# Patient Record
Sex: FEMALE | Race: WHITE | Employment: UNEMPLOYED | URBAN - NONMETROPOLITAN AREA
[De-identification: names, ages, dates, MRNs, and addresses within clinical notes are randomized per-mention and may not be internally consistent; named-entity substitution may affect disease eponyms.]

---

## 2017-01-12 ENCOUNTER — TELEPHONE (OUTPATIENT)
Dept: FAMILY MEDICINE CLINIC | Facility: CLINIC | Age: 67
End: 2017-01-12

## 2017-01-12 NOTE — TELEPHONE ENCOUNTER
Patient is ok with waiting until Monday. She went to see a vein specialist and they were the ones that told her she should follow up with ortho and \"spine specialist\". She is going to call [facility] to schedule an appt.  But wanted to let Dr. Kaylynn chavez

## 2017-01-31 ENCOUNTER — PATIENT OUTREACH (OUTPATIENT)
Dept: FAMILY MEDICINE CLINIC | Facility: CLINIC | Age: 67
End: 2017-01-31

## 2017-02-03 ENCOUNTER — TELEPHONE (OUTPATIENT)
Dept: FAMILY MEDICINE CLINIC | Facility: CLINIC | Age: 67
End: 2017-02-03

## 2017-02-03 DIAGNOSIS — M17.0 PRIMARY OSTEOARTHRITIS OF BOTH KNEES: Primary | ICD-10-CM

## 2017-02-03 DIAGNOSIS — M54.9 BACK PAIN, UNSPECIFIED BACK LOCATION, UNSPECIFIED BACK PAIN LATERALITY, UNSPECIFIED CHRONICITY: ICD-10-CM

## 2017-02-03 DIAGNOSIS — M25.569 KNEE PAIN, UNSPECIFIED CHRONICITY, UNSPECIFIED LATERALITY: ICD-10-CM

## 2017-02-07 ENCOUNTER — OFFICE VISIT (OUTPATIENT)
Dept: FAMILY MEDICINE CLINIC | Facility: CLINIC | Age: 67
End: 2017-02-07

## 2017-02-07 ENCOUNTER — APPOINTMENT (OUTPATIENT)
Dept: LAB | Age: 67
End: 2017-02-07
Attending: FAMILY MEDICINE
Payer: MEDICARE

## 2017-02-07 VITALS
TEMPERATURE: 98 F | BODY MASS INDEX: 39 KG/M2 | WEIGHT: 243.25 LBS | DIASTOLIC BLOOD PRESSURE: 76 MMHG | SYSTOLIC BLOOD PRESSURE: 112 MMHG

## 2017-02-07 DIAGNOSIS — E78.2 MIXED HYPERLIPIDEMIA: ICD-10-CM

## 2017-02-07 DIAGNOSIS — I10 ESSENTIAL HYPERTENSION: ICD-10-CM

## 2017-02-07 DIAGNOSIS — Z79.899 ENCOUNTER FOR LONG-TERM (CURRENT) USE OF MEDICATIONS: Primary | ICD-10-CM

## 2017-02-07 LAB
ALBUMIN SERPL-MCNC: 4 G/DL (ref 3.5–4.8)
ALP LIVER SERPL-CCNC: 77 U/L (ref 50–130)
ALT SERPL-CCNC: 20 U/L (ref 14–54)
AST SERPL-CCNC: 12 U/L (ref 15–41)
BILIRUB SERPL-MCNC: 0.8 MG/DL (ref 0.1–2)
BUN BLD-MCNC: 22 MG/DL (ref 8–20)
CALCIUM BLD-MCNC: 8.8 MG/DL (ref 8.3–10.3)
CHLORIDE: 103 MMOL/L (ref 101–111)
CHOLEST SMN-MCNC: 163 MG/DL (ref ?–200)
CO2: 30 MMOL/L (ref 22–32)
CREAT BLD-MCNC: 0.96 MG/DL (ref 0.55–1.02)
GLUCOSE BLD-MCNC: 94 MG/DL (ref 70–99)
HDLC SERPL-MCNC: 46 MG/DL (ref 45–?)
HDLC SERPL: 3.54 {RATIO} (ref ?–4.44)
LDLC SERPL CALC-MCNC: 73 MG/DL (ref ?–130)
M PROTEIN MFR SERPL ELPH: 7.9 G/DL (ref 6.1–8.3)
NONHDLC SERPL-MCNC: 117 MG/DL (ref ?–130)
POTASSIUM SERPL-SCNC: 4.6 MMOL/L (ref 3.6–5.1)
SODIUM SERPL-SCNC: 139 MMOL/L (ref 136–144)
TRIGLYCERIDES: 222 MG/DL (ref ?–150)
VLDL: 44 MG/DL (ref 5–40)

## 2017-02-07 PROCEDURE — 80053 COMPREHEN METABOLIC PANEL: CPT

## 2017-02-07 PROCEDURE — 36415 COLL VENOUS BLD VENIPUNCTURE: CPT

## 2017-02-07 PROCEDURE — 99214 OFFICE O/P EST MOD 30 MIN: CPT | Performed by: FAMILY MEDICINE

## 2017-02-07 PROCEDURE — 80061 LIPID PANEL: CPT

## 2017-02-07 RX ORDER — PRAVASTATIN SODIUM 20 MG
20 TABLET ORAL NIGHTLY
Qty: 90 TABLET | Refills: 1 | Status: SHIPPED | OUTPATIENT
Start: 2017-02-07 | End: 2017-08-06

## 2017-02-07 RX ORDER — LISINOPRIL AND HYDROCHLOROTHIAZIDE 20; 12.5 MG/1; MG/1
1 TABLET ORAL DAILY
Qty: 90 TABLET | Refills: 1 | Status: SHIPPED | OUTPATIENT
Start: 2017-02-07 | End: 2017-09-01

## 2017-02-07 RX ORDER — MELOXICAM 15 MG/1
15 TABLET ORAL DAILY
COMMUNITY
Start: 2017-02-06 | End: 2017-06-09

## 2017-02-07 NOTE — ASSESSMENT & PLAN NOTE
As for her hypertension, Blood Pressure is well controlled, no significant medication side effects noted.    PLAN: will continue present medications, reviewed diet, exercise and weight control, and labs as ordered

## 2017-02-07 NOTE — PATIENT INSTRUCTIONS
What Is a Normal Blood Pressure?   The Joint National Committee on Prevention, Detection, Evaluation, and Treatment of High Blood Pressure has classified blood pressure measurements into several categories:  • Normal blood pressure is systolic pressure less Dysfunction  • Problems with your vision    Overview  \"Blood pressure\" is the force of blood pushing against the walls of the arteries as the heart pumps blood. If this pressure rises and stays high over time, it can damage the body in many ways.   About drinks* per day. Women and lighter weight persons: limit to <= 1 drink* per day. 2-4 mmHg      * 1 drink = ½ oz or 15 mL ethanol (e.g. 12 oz beer, 5 oz wine, 1.5 oz 80-proof whiskey. Effects are dose and time dependent.

## 2017-02-07 NOTE — ASSESSMENT & PLAN NOTE
As for her cholesterol, Lipids are well controlled, no significant medication side effects noted.    PLAN: will continue present medications, reviewed diet, exercise and weight control, and labs as ordered

## 2017-02-07 NOTE — PROGRESS NOTES
Nimco Holt is a 73year old female. Patient presents with:  Medication Follow-Up: room 5      HPI:   Patient presents for recheck of her  hypertension.  Pt has been taking medications as instructed, no medication side effects, home BP monitoring not re breath with exertion  CARDIOVASCULAR: denies chest pain on exertion  NEURO: denies headaches    EXAM:   /76 mmHg  Temp(Src) 97.5 °F (36.4 °C) (Temporal)  Wt 243 lb 4 oz Body mass index is 38.68 kg/(m^2).       GENERAL: well developed, well nourished,i Disp Refills    Pravastatin Sodium 20 MG Oral Tab 90 tablet 1      Sig: Take 1 tablet (20 mg total) by mouth nightly. Lisinopril-Hydrochlorothiazide 20-12.5 MG Oral Tab 90 tablet 1      Sig: Take 1 tablet by mouth daily.                [place],

## 2017-02-08 DIAGNOSIS — I10 ESSENTIAL HYPERTENSION: ICD-10-CM

## 2017-02-08 DIAGNOSIS — E78.2 MIXED HYPERLIPIDEMIA: Primary | ICD-10-CM

## 2017-02-09 ENCOUNTER — MED REC SCAN ONLY (OUTPATIENT)
Dept: FAMILY MEDICINE CLINIC | Facility: CLINIC | Age: 67
End: 2017-02-09

## 2017-06-09 RX ORDER — MELOXICAM 15 MG/1
15 TABLET ORAL DAILY
Qty: 30 TABLET | Refills: 0 | Status: SHIPPED | OUTPATIENT
Start: 2017-06-09 | End: 2017-07-11

## 2017-07-11 ENCOUNTER — OFFICE VISIT (OUTPATIENT)
Dept: FAMILY MEDICINE CLINIC | Facility: CLINIC | Age: 67
End: 2017-07-11

## 2017-07-11 VITALS
BODY MASS INDEX: 39.41 KG/M2 | HEART RATE: 80 BPM | WEIGHT: 245.25 LBS | TEMPERATURE: 98 F | DIASTOLIC BLOOD PRESSURE: 70 MMHG | SYSTOLIC BLOOD PRESSURE: 112 MMHG | HEIGHT: 66.25 IN

## 2017-07-11 DIAGNOSIS — Z78.0 POSTMENOPAUSAL: ICD-10-CM

## 2017-07-11 DIAGNOSIS — Z12.31 VISIT FOR SCREENING MAMMOGRAM: ICD-10-CM

## 2017-07-11 DIAGNOSIS — M17.0 PRIMARY OSTEOARTHRITIS OF BOTH KNEES: Primary | ICD-10-CM

## 2017-07-11 DIAGNOSIS — Z00.00 ENCOUNTER FOR MEDICARE ANNUAL WELLNESS EXAM: ICD-10-CM

## 2017-07-11 DIAGNOSIS — Z12.11 SCREENING FOR COLON CANCER: ICD-10-CM

## 2017-07-11 DIAGNOSIS — Z13.39 SCREENING FOR ALCOHOL PROBLEM: ICD-10-CM

## 2017-07-11 DIAGNOSIS — Z13.31 DEPRESSION SCREENING: ICD-10-CM

## 2017-07-11 PROCEDURE — G0442 ANNUAL ALCOHOL SCREEN 15 MIN: HCPCS | Performed by: FAMILY MEDICINE

## 2017-07-11 PROCEDURE — G0438 PPPS, INITIAL VISIT: HCPCS | Performed by: FAMILY MEDICINE

## 2017-07-11 RX ORDER — MELOXICAM 15 MG/1
15 TABLET ORAL DAILY
Qty: 90 TABLET | Refills: 1 | Status: SHIPPED | OUTPATIENT
Start: 2017-07-11 | End: 2018-01-10

## 2017-07-11 NOTE — PATIENT INSTRUCTIONS
Bethanie Medrano Bryson's SCREENING SCHEDULE   Tests on this list are recommended by your physician but may not be covered, or covered at this frequency, by your insurer. Please check with your insurance carrier before scheduling to verify coverage.    PREVENTATIV often if abnormal Colonoscopy,10 Years due on 06/08/2001 Update Health Maintenance if applicable    Flex Sigmoidoscopy Screen  Covered every 5 years No results found for this or any previous visit. No flowsheet data found.      Fecal Occult Blood   Covered once after your 65th birthday    Pneumococcal 23 (Pneumovax)  Covered Once after 65 No orders found for this or any previous visit.  Please get once after your 65th birthday    Hepatitis B for Moderate/High Risk       No orders found for this or any previou

## 2017-07-11 NOTE — PROGRESS NOTES
HPI:   Bishnu Lynch is a 78year old female who presents for a Medicare Initial Annual Wellness visit (Once after 12 month Medicare anniversary) .     Feels well    Her last annual assessment has been over 1 year: Annual Physical due on 06/28/2017 includes cath percutaneous  transluminal coronary angioplasty (2000). Her family history includes Other in her father; Renal Disease in her mother. SOCIAL HISTORY:   She  reports that she quit smoking about 16 years ago.  She has never used smokeless t daily.         Ms. Tania Jean-Baptiste already takes aspirin and has it on her medication list.     Diet assessment: good     Advanced Directive:  Living Will on file in [address]? Terrence Moment does not have a Living Will on file in [address]  Discussed with patient and kamron have 3 or more medical conditions?: 0-No    Have you fallen in the last 12 months?: 0-No    Do you accidently lose urine?: 0-No    Do you have difficulty seeing?: 0-No    Do you have any difficulty walking or getting up?: 0-No    Do you have any tripping h years No results found for this or any previous visit. No flowsheet data found. Fecal Occult Blood Annually No results found for: FOB No flowsheet data found.     Glaucoma Screening      Ophthalmology Visit Annually: Diabetics, FHx Glaucoma, AA>50, Hisp DISEASE MONITORING Internal Lab or Procedure External Lab or Procedure   Annual Monitoring of Persistent     Medications (ACE/ARB, digoxin diuretics, anticonvulsants.)    Potassium  Annually Potassium (mmol/L)   Date Value   02/07/2017 4.6    No flowsheet

## 2017-07-24 ENCOUNTER — APPOINTMENT (OUTPATIENT)
Dept: LAB | Facility: HOSPITAL | Age: 67
End: 2017-07-24
Attending: FAMILY MEDICINE
Payer: MEDICARE

## 2017-07-24 DIAGNOSIS — Z12.11 SCREENING FOR COLON CANCER: ICD-10-CM

## 2017-07-24 PROCEDURE — 82272 OCCULT BLD FECES 1-3 TESTS: CPT

## 2017-08-07 ENCOUNTER — HOSPITAL ENCOUNTER (OUTPATIENT)
Dept: BONE DENSITY | Age: 67
Discharge: HOME | End: 2017-08-07
Attending: FAMILY MEDICINE
Payer: MEDICARE

## 2017-08-07 DIAGNOSIS — Z78.0 POSTMENOPAUSAL: ICD-10-CM

## 2017-08-07 PROCEDURE — 77080 DXA BONE DENSITY AXIAL: CPT | Performed by: FAMILY MEDICINE

## 2017-09-01 ENCOUNTER — TELEPHONE (OUTPATIENT)
Dept: FAMILY MEDICINE CLINIC | Facility: CLINIC | Age: 67
End: 2017-09-01

## 2017-09-01 DIAGNOSIS — I10 ESSENTIAL HYPERTENSION: ICD-10-CM

## 2017-09-01 RX ORDER — PRAVASTATIN SODIUM 20 MG
TABLET ORAL
Qty: 90 TABLET | Refills: 0 | Status: SHIPPED | OUTPATIENT
Start: 2017-09-01 | End: 2017-12-05

## 2017-09-01 RX ORDER — LISINOPRIL AND HYDROCHLOROTHIAZIDE 20; 12.5 MG/1; MG/1
1 TABLET ORAL DAILY
Qty: 90 TABLET | Refills: 0 | Status: SHIPPED | OUTPATIENT
Start: 2017-09-01 | End: 2017-12-05

## 2017-09-01 NOTE — TELEPHONE ENCOUNTER
Last Ov: 7/11/2017  112/70  Last labs: 2/7/2017  Pravastatin: 2/7/2017 #90 w/1Rf  Lisinopril: 2/7/2017 #90 w/ 1RF    No future appointments.   Letter sent for labs

## 2017-09-12 ENCOUNTER — APPOINTMENT (OUTPATIENT)
Dept: LAB | Age: 67
End: 2017-09-12
Attending: FAMILY MEDICINE
Payer: MEDICARE

## 2017-09-12 DIAGNOSIS — I10 ESSENTIAL HYPERTENSION: ICD-10-CM

## 2017-09-12 DIAGNOSIS — E78.2 MIXED HYPERLIPIDEMIA: ICD-10-CM

## 2017-09-12 LAB
ALBUMIN SERPL-MCNC: 4 G/DL (ref 3.5–4.8)
ALP LIVER SERPL-CCNC: 75 U/L (ref 55–142)
ALT SERPL-CCNC: 29 U/L (ref 14–54)
AST SERPL-CCNC: 17 U/L (ref 15–41)
BILIRUB SERPL-MCNC: 0.7 MG/DL (ref 0.1–2)
BUN BLD-MCNC: 23 MG/DL (ref 8–20)
CALCIUM BLD-MCNC: 9.5 MG/DL (ref 8.3–10.3)
CHLORIDE: 105 MMOL/L (ref 101–111)
CHOLEST SMN-MCNC: 170 MG/DL (ref ?–200)
CO2: 29 MMOL/L (ref 22–32)
CREAT BLD-MCNC: 1.09 MG/DL (ref 0.55–1.02)
GLUCOSE BLD-MCNC: 108 MG/DL (ref 70–99)
HDLC SERPL-MCNC: 46 MG/DL (ref 45–?)
HDLC SERPL: 3.7 {RATIO} (ref ?–4.44)
LDLC SERPL CALC-MCNC: 79 MG/DL (ref ?–130)
LDLC SERPL-MCNC: 45 MG/DL (ref 5–40)
M PROTEIN MFR SERPL ELPH: 8.1 G/DL (ref 6.1–8.3)
NONHDLC SERPL-MCNC: 124 MG/DL (ref ?–130)
POTASSIUM SERPL-SCNC: 4.8 MMOL/L (ref 3.6–5.1)
SODIUM SERPL-SCNC: 141 MMOL/L (ref 136–144)
TRIGLYCERIDES: 224 MG/DL (ref ?–150)

## 2017-09-12 PROCEDURE — 80053 COMPREHEN METABOLIC PANEL: CPT

## 2017-09-12 PROCEDURE — 36415 COLL VENOUS BLD VENIPUNCTURE: CPT

## 2017-09-12 PROCEDURE — 80061 LIPID PANEL: CPT

## 2017-09-14 DIAGNOSIS — E78.2 MIXED HYPERLIPIDEMIA: Primary | ICD-10-CM

## 2017-12-05 ENCOUNTER — TELEPHONE (OUTPATIENT)
Dept: FAMILY MEDICINE CLINIC | Facility: CLINIC | Age: 67
End: 2017-12-05

## 2017-12-05 DIAGNOSIS — I10 ESSENTIAL HYPERTENSION: ICD-10-CM

## 2017-12-05 RX ORDER — LISINOPRIL AND HYDROCHLOROTHIAZIDE 20; 12.5 MG/1; MG/1
1 TABLET ORAL DAILY
Qty: 90 TABLET | Refills: 0 | Status: SHIPPED | OUTPATIENT
Start: 2017-12-05 | End: 2018-03-05

## 2017-12-05 RX ORDER — PRAVASTATIN SODIUM 20 MG
TABLET ORAL
Qty: 90 TABLET | Refills: 0 | Status: ON HOLD | OUTPATIENT
Start: 2017-12-05 | End: 2018-02-13

## 2017-12-05 NOTE — TELEPHONE ENCOUNTER
Lisinopril-Hydrochlorothiazide 20-12.5 MG Oral Tab   Pravastatin Sodium 20 MG Oral Tab  Call to [facility] on [address]

## 2017-12-05 NOTE — TELEPHONE ENCOUNTER
Last OV: 7/11/2017  Last labs: 9/12/2017  Pravastatin: 9/1/2017 #90 no Rf  Lisinopril: 9/1/2017  #90 no RF

## 2018-01-01 ENCOUNTER — MED REC SCAN ONLY (OUTPATIENT)
Dept: FAMILY MEDICINE CLINIC | Facility: CLINIC | Age: 68
End: 2018-01-01

## 2018-01-04 ENCOUNTER — TELEPHONE (OUTPATIENT)
Dept: FAMILY MEDICINE CLINIC | Facility: CLINIC | Age: 68
End: 2018-01-04

## 2018-01-04 NOTE — TELEPHONE ENCOUNTER
Jerome Guevara Nurse   Caller: Unspecified (Today,  9:37 AM)             DR Ruth KRAMER RETURNED A CALL      [phone]      States that she is not considered her patient since she has not been seen in 3 years.   They did offer to

## 2018-01-04 NOTE — TELEPHONE ENCOUNTER
Dr Sandrine Ray cancelled her appt, she needs a new cardio referral. She is keeping her pre-op appt with [facility] on 1/22, hoping she can find another cardio.

## 2018-01-08 NOTE — TELEPHONE ENCOUNTER
I would recommend staying with the practice since they have her records    Would recommend  Dr jodi Pappas

## 2018-01-08 NOTE — TELEPHONE ENCOUNTER
Needs new cardiologist, Hasn't seen Dr. Venus Clay for 3 years and rather than schedule with an associate she would like your opinion.

## 2018-01-08 NOTE — TELEPHONE ENCOUNTER
Patient advised, she's going to try to see Dr. Mimi Nogueira because he practices at [facility].

## 2018-01-10 ENCOUNTER — TELEPHONE (OUTPATIENT)
Dept: FAMILY MEDICINE CLINIC | Facility: CLINIC | Age: 68
End: 2018-01-10

## 2018-01-10 RX ORDER — MELOXICAM 15 MG/1
15 TABLET ORAL DAILY
Qty: 90 TABLET | Refills: 1 | Status: SHIPPED | OUTPATIENT
Start: 2018-01-10 | End: 2018-02-15

## 2018-01-10 NOTE — TELEPHONE ENCOUNTER
Meloxicam 15 MG - [facility] in [facility] on [address].

## 2018-01-16 RX ORDER — SENNOSIDES 8.6 MG
1300 CAPSULE ORAL DAILY
Status: ON HOLD | COMMUNITY
End: 2018-04-24

## 2018-01-22 ENCOUNTER — OFFICE VISIT (OUTPATIENT)
Dept: FAMILY MEDICINE CLINIC | Facility: CLINIC | Age: 68
End: 2018-01-22

## 2018-01-22 ENCOUNTER — LAB ENCOUNTER (OUTPATIENT)
Dept: LAB | Age: 68
End: 2018-01-22
Attending: FAMILY MEDICINE
Payer: MEDICARE

## 2018-01-22 VITALS
HEIGHT: 66.5 IN | DIASTOLIC BLOOD PRESSURE: 64 MMHG | OXYGEN SATURATION: 96 % | BODY MASS INDEX: 39.39 KG/M2 | HEART RATE: 86 BPM | SYSTOLIC BLOOD PRESSURE: 110 MMHG | TEMPERATURE: 98 F | WEIGHT: 248 LBS

## 2018-01-22 DIAGNOSIS — I10 ESSENTIAL HYPERTENSION: ICD-10-CM

## 2018-01-22 DIAGNOSIS — Z01.810 PRE-OPERATIVE CARDIOVASCULAR EXAMINATION: ICD-10-CM

## 2018-01-22 DIAGNOSIS — Z98.61 S/P PTCA (PERCUTANEOUS TRANSLUMINAL CORONARY ANGIOPLASTY): ICD-10-CM

## 2018-01-22 DIAGNOSIS — Z01.818 PREPROCEDURAL EXAMINATION: ICD-10-CM

## 2018-01-22 DIAGNOSIS — M17.0 PRIMARY OSTEOARTHRITIS OF BOTH KNEES: ICD-10-CM

## 2018-01-22 DIAGNOSIS — M17.11 PRIMARY OSTEOARTHRITIS OF RIGHT KNEE: ICD-10-CM

## 2018-01-22 DIAGNOSIS — Z01.812 PRE-PROCEDURE LAB EXAM: ICD-10-CM

## 2018-01-22 DIAGNOSIS — Z01.818 PREPROCEDURAL EXAMINATION: Primary | ICD-10-CM

## 2018-01-22 LAB
ALBUMIN SERPL-MCNC: 4.2 G/DL (ref 3.5–4.8)
ALP LIVER SERPL-CCNC: 70 U/L (ref 55–142)
ALT SERPL-CCNC: 23 U/L (ref 14–54)
ANTIBODY SCREEN: NEGATIVE
AST SERPL-CCNC: 15 U/L (ref 15–41)
BASOPHILS # BLD AUTO: 0.07 X10(3) UL (ref 0–0.1)
BASOPHILS NFR BLD AUTO: 0.9 %
BILIRUB SERPL-MCNC: 0.7 MG/DL (ref 0.1–2)
BILIRUB UR QL STRIP.AUTO: NEGATIVE
BUN BLD-MCNC: 31 MG/DL (ref 8–20)
CALCIUM BLD-MCNC: 9.4 MG/DL (ref 8.3–10.3)
CHLORIDE: 104 MMOL/L (ref 101–111)
CLARITY UR REFRACT.AUTO: CLEAR
CO2: 30 MMOL/L (ref 22–32)
COLOR UR AUTO: YELLOW
CREAT BLD-MCNC: 1.04 MG/DL (ref 0.55–1.02)
EOSINOPHIL # BLD AUTO: 0.2 X10(3) UL (ref 0–0.3)
EOSINOPHIL NFR BLD AUTO: 2.7 %
ERYTHROCYTE [DISTWIDTH] IN BLOOD BY AUTOMATED COUNT: 14.2 % (ref 11.5–16)
GLUCOSE BLD-MCNC: 109 MG/DL (ref 70–99)
GLUCOSE UR STRIP.AUTO-MCNC: NEGATIVE MG/DL
HCT VFR BLD AUTO: 38.6 % (ref 34–50)
HGB BLD-MCNC: 12.1 G/DL (ref 12–16)
IMMATURE GRANULOCYTE COUNT: 0.02 X10(3) UL (ref 0–1)
IMMATURE GRANULOCYTE RATIO %: 0.3 %
KETONES UR STRIP.AUTO-MCNC: NEGATIVE MG/DL
LEUKOCYTE ESTERASE UR QL STRIP.AUTO: NEGATIVE
LYMPHOCYTES # BLD AUTO: 1.97 X10(3) UL (ref 0.9–4)
LYMPHOCYTES NFR BLD AUTO: 26.4 %
M PROTEIN MFR SERPL ELPH: 8.3 G/DL (ref 6.1–8.3)
MCH RBC QN AUTO: 27.3 PG (ref 27–33.2)
MCHC RBC AUTO-ENTMCNC: 31.3 G/DL (ref 31–37)
MCV RBC AUTO: 86.9 FL (ref 81–100)
MONOCYTES # BLD AUTO: 0.41 X10(3) UL (ref 0.1–0.6)
MONOCYTES NFR BLD AUTO: 5.5 %
NEUTROPHIL ABS PRELIM: 4.78 X10 (3) UL (ref 1.3–6.7)
NEUTROPHILS # BLD AUTO: 4.78 X10(3) UL (ref 1.3–6.7)
NEUTROPHILS NFR BLD AUTO: 64.2 %
NITRITE UR QL STRIP.AUTO: NEGATIVE
PH UR STRIP.AUTO: 5 [PH] (ref 4.5–8)
PLATELET # BLD AUTO: 234 10(3)UL (ref 150–450)
POTASSIUM SERPL-SCNC: 5 MMOL/L (ref 3.6–5.1)
PROT UR STRIP.AUTO-MCNC: NEGATIVE MG/DL
RBC # BLD AUTO: 4.44 X10(6)UL (ref 3.8–5.1)
RBC UR QL AUTO: NEGATIVE
RED CELL DISTRIBUTION WIDTH-SD: 45 FL (ref 35.1–46.3)
RH BLOOD TYPE: POSITIVE
SODIUM SERPL-SCNC: 139 MMOL/L (ref 136–144)
SP GR UR STRIP.AUTO: 1.02 (ref 1–1.03)
UROBILINOGEN UR STRIP.AUTO-MCNC: <2 MG/DL
WBC # BLD AUTO: 7.5 X10(3) UL (ref 4–13)

## 2018-01-22 PROCEDURE — 36415 COLL VENOUS BLD VENIPUNCTURE: CPT

## 2018-01-22 PROCEDURE — 87081 CULTURE SCREEN ONLY: CPT

## 2018-01-22 PROCEDURE — 80053 COMPREHEN METABOLIC PANEL: CPT

## 2018-01-22 PROCEDURE — 71046 X-RAY EXAM CHEST 2 VIEWS: CPT | Performed by: FAMILY MEDICINE

## 2018-01-22 PROCEDURE — 81003 URINALYSIS AUTO W/O SCOPE: CPT

## 2018-01-22 PROCEDURE — 93000 ELECTROCARDIOGRAM COMPLETE: CPT | Performed by: FAMILY MEDICINE

## 2018-01-22 PROCEDURE — 85025 COMPLETE CBC W/AUTO DIFF WBC: CPT

## 2018-01-22 PROCEDURE — 90686 IIV4 VACC NO PRSV 0.5 ML IM: CPT | Performed by: FAMILY MEDICINE

## 2018-01-22 PROCEDURE — 99215 OFFICE O/P EST HI 40 MIN: CPT | Performed by: FAMILY MEDICINE

## 2018-01-22 PROCEDURE — 86900 BLOOD TYPING SEROLOGIC ABO: CPT

## 2018-01-22 PROCEDURE — 86850 RBC ANTIBODY SCREEN: CPT

## 2018-01-22 PROCEDURE — 86901 BLOOD TYPING SEROLOGIC RH(D): CPT

## 2018-01-22 PROCEDURE — G0008 ADMIN INFLUENZA VIRUS VAC: HCPCS | Performed by: FAMILY MEDICINE

## 2018-01-22 NOTE — H&P
Bren Rinaldi is a 78year old female. Patient presents with:  Pre-Op Exam: total knee replacement. . room 5      HPI:   PREOP EXAM      PRE-OP Physical  What is the full name of procedure/ surgery? LEFT TOTAL KNEE REPLACEMENT  Date being surgery or proced Hyperpiesia 5/11/2015   • MI (myocardial infarction) 2000   • Obesity (BMI 30-39. 9) 5/11/2015   • Visual impairment     glasses for reading      Social History:  Smoking status: Former Smoker                                                              Pac REFLEX; Future  -     CARDIO - EXTERNAL    Primary osteoarthritis of right knee    Essential hypertension  -     ELECTROCARDIOGRAM, COMPLETE  -     CARDIO - EXTERNAL    S/P PTCA (percutaneous transluminal coronary angioplasty)  -     ELECTROCARDIOGRAM, COM

## 2018-02-01 ENCOUNTER — MED REC SCAN ONLY (OUTPATIENT)
Dept: FAMILY MEDICINE CLINIC | Facility: CLINIC | Age: 68
End: 2018-02-01

## 2018-02-02 ENCOUNTER — ANESTHESIA EVENT (OUTPATIENT)
Dept: SURGERY | Facility: HOSPITAL | Age: 68
End: 2018-02-02

## 2018-02-06 ENCOUNTER — TELEPHONE (OUTPATIENT)
Dept: FAMILY MEDICINE CLINIC | Facility: CLINIC | Age: 68
End: 2018-02-06

## 2018-02-06 NOTE — TELEPHONE ENCOUNTER
Advised pt that Dr. Windy Serrano provided the \"ok\" for surgery. Pt states that she saw cardiologist yesterday for nuclear stress test and not advised if she was cleared or not.  Advised pt that Cardiologist needs to clear her but from Dr. Windy Serrano, she was etienne

## 2018-02-13 ENCOUNTER — HOSPITAL ENCOUNTER (INPATIENT)
Facility: HOSPITAL | Age: 68
LOS: 2 days | Discharge: HOME HEALTH | DRG: 470 | End: 2018-02-15
Attending: ORTHOPAEDIC SURGERY | Admitting: ORTHOPAEDIC SURGERY
Payer: MEDICARE

## 2018-02-13 ENCOUNTER — APPOINTMENT (OUTPATIENT)
Dept: GENERAL RADIOLOGY | Facility: HOSPITAL | Age: 68
DRG: 470 | End: 2018-02-13
Attending: PHYSICIAN ASSISTANT
Payer: MEDICARE

## 2018-02-13 ENCOUNTER — ANESTHESIA (OUTPATIENT)
Dept: SURGERY | Facility: HOSPITAL | Age: 68
End: 2018-02-13

## 2018-02-13 ENCOUNTER — SURGERY (OUTPATIENT)
Age: 68
End: 2018-02-13

## 2018-02-13 DIAGNOSIS — M17.0 PRIMARY OSTEOARTHRITIS OF BOTH KNEES: Primary | ICD-10-CM

## 2018-02-13 LAB
ANTIBODY SCREEN: NEGATIVE
RH BLOOD TYPE: POSITIVE

## 2018-02-13 PROCEDURE — 73560 X-RAY EXAM OF KNEE 1 OR 2: CPT | Performed by: PHYSICIAN ASSISTANT

## 2018-02-13 PROCEDURE — 0SRD0J9 REPLACEMENT OF LEFT KNEE JOINT WITH SYNTHETIC SUBSTITUTE, CEMENTED, OPEN APPROACH: ICD-10-PCS | Performed by: ORTHOPAEDIC SURGERY

## 2018-02-13 PROCEDURE — 3E0T3BZ INTRODUCTION OF ANESTHETIC AGENT INTO PERIPHERAL NERVES AND PLEXI, PERCUTANEOUS APPROACH: ICD-10-PCS | Performed by: STUDENT IN AN ORGANIZED HEALTH CARE EDUCATION/TRAINING PROGRAM

## 2018-02-13 PROCEDURE — 99222 1ST HOSP IP/OBS MODERATE 55: CPT | Performed by: HOSPITALIST

## 2018-02-13 DEVICE — PSN FEM CR CMT CCR STD SZ8 L: Type: IMPLANTABLE DEVICE | Site: KNEE | Status: FUNCTIONAL

## 2018-02-13 DEVICE — SCREW GUID 33MM HEX HEAD MIS: Type: IMPLANTABLE DEVICE | Site: KNEE | Status: FUNCTIONAL

## 2018-02-13 DEVICE — PSN TIB STM 5 DEG SZ E L: Type: IMPLANTABLE DEVICE | Site: KNEE | Status: FUNCTIONAL

## 2018-02-13 DEVICE — CEMENT BONE ZIM PALICOS R +G: Type: IMPLANTABLE DEVICE | Site: KNEE | Status: FUNCTIONAL

## 2018-02-13 DEVICE — SCREW GUID 48MM STRL HEX HEAD: Type: IMPLANTABLE DEVICE | Site: KNEE | Status: FUNCTIONAL

## 2018-02-13 DEVICE — PSN ALL POLY PAT PLY 32MM: Type: IMPLANTABLE DEVICE | Site: KNEE | Status: FUNCTIONAL

## 2018-02-13 RX ORDER — SODIUM CHLORIDE, SODIUM LACTATE, POTASSIUM CHLORIDE, CALCIUM CHLORIDE 600; 310; 30; 20 MG/100ML; MG/100ML; MG/100ML; MG/100ML
INJECTION, SOLUTION INTRAVENOUS CONTINUOUS
Status: DISCONTINUED | OUTPATIENT
Start: 2018-02-13 | End: 2018-02-13 | Stop reason: HOSPADM

## 2018-02-13 RX ORDER — DOCUSATE SODIUM 100 MG/1
100 CAPSULE, LIQUID FILLED ORAL 2 TIMES DAILY
Status: DISCONTINUED | OUTPATIENT
Start: 2018-02-13 | End: 2018-02-15

## 2018-02-13 RX ORDER — CLINDAMYCIN PHOSPHATE 900 MG/50ML
900 INJECTION INTRAVENOUS EVERY 8 HOURS
Status: COMPLETED | OUTPATIENT
Start: 2018-02-13 | End: 2018-02-13

## 2018-02-13 RX ORDER — DIPHENHYDRAMINE HCL 25 MG
25 CAPSULE ORAL EVERY 4 HOURS PRN
Status: DISCONTINUED | OUTPATIENT
Start: 2018-02-13 | End: 2018-02-15

## 2018-02-13 RX ORDER — MORPHINE SULFATE 2 MG/ML
2 INJECTION, SOLUTION INTRAMUSCULAR; INTRAVENOUS EVERY 5 MIN PRN
Status: DISCONTINUED | OUTPATIENT
Start: 2018-02-13 | End: 2018-02-13 | Stop reason: HOSPADM

## 2018-02-13 RX ORDER — MEPERIDINE HYDROCHLORIDE 25 MG/ML
12.5 INJECTION INTRAMUSCULAR; INTRAVENOUS; SUBCUTANEOUS AS NEEDED
Status: DISCONTINUED | OUTPATIENT
Start: 2018-02-13 | End: 2018-02-13 | Stop reason: HOSPADM

## 2018-02-13 RX ORDER — NALOXONE HYDROCHLORIDE 0.4 MG/ML
80 INJECTION, SOLUTION INTRAMUSCULAR; INTRAVENOUS; SUBCUTANEOUS AS NEEDED
Status: DISCONTINUED | OUTPATIENT
Start: 2018-02-13 | End: 2018-02-13 | Stop reason: HOSPADM

## 2018-02-13 RX ORDER — TRAMADOL HYDROCHLORIDE 50 MG/1
50 TABLET ORAL EVERY 6 HOURS
Status: DISCONTINUED | OUTPATIENT
Start: 2018-02-13 | End: 2018-02-15

## 2018-02-13 RX ORDER — CYCLOBENZAPRINE HCL 5 MG
5 TABLET ORAL 3 TIMES DAILY PRN
Status: DISCONTINUED | OUTPATIENT
Start: 2018-02-13 | End: 2018-02-15

## 2018-02-13 RX ORDER — MIDAZOLAM HYDROCHLORIDE 1 MG/ML
1 INJECTION INTRAMUSCULAR; INTRAVENOUS EVERY 5 MIN PRN
Status: DISCONTINUED | OUTPATIENT
Start: 2018-02-13 | End: 2018-02-13 | Stop reason: HOSPADM

## 2018-02-13 RX ORDER — OXYCODONE HCL 10 MG/1
10 TABLET, FILM COATED, EXTENDED RELEASE ORAL
Status: DISCONTINUED | OUTPATIENT
Start: 2018-02-13 | End: 2018-02-14

## 2018-02-13 RX ORDER — PRAVASTATIN SODIUM 20 MG
20 TABLET ORAL NIGHTLY
Status: DISCONTINUED | OUTPATIENT
Start: 2018-02-13 | End: 2018-02-15

## 2018-02-13 RX ORDER — BISACODYL 10 MG
10 SUPPOSITORY, RECTAL RECTAL
Status: DISCONTINUED | OUTPATIENT
Start: 2018-02-13 | End: 2018-02-15

## 2018-02-13 RX ORDER — OXYCODONE HYDROCHLORIDE 10 MG/1
10 TABLET ORAL EVERY 4 HOURS PRN
Status: DISCONTINUED | OUTPATIENT
Start: 2018-02-13 | End: 2018-02-14

## 2018-02-13 RX ORDER — POLYETHYLENE GLYCOL 3350 17 G/17G
17 POWDER, FOR SOLUTION ORAL DAILY PRN
Status: DISCONTINUED | OUTPATIENT
Start: 2018-02-13 | End: 2018-02-15

## 2018-02-13 RX ORDER — MELATONIN
325
Status: DISCONTINUED | OUTPATIENT
Start: 2018-02-13 | End: 2018-02-15

## 2018-02-13 RX ORDER — ACETAMINOPHEN 325 MG/1
650 TABLET ORAL ONCE
Status: COMPLETED | OUTPATIENT
Start: 2018-02-13 | End: 2018-02-13

## 2018-02-13 RX ORDER — MORPHINE SULFATE 4 MG/ML
1 INJECTION, SOLUTION INTRAMUSCULAR; INTRAVENOUS EVERY 2 HOUR PRN
Status: DISCONTINUED | OUTPATIENT
Start: 2018-02-13 | End: 2018-02-15

## 2018-02-13 RX ORDER — DIPHENHYDRAMINE HYDROCHLORIDE 50 MG/ML
25 INJECTION INTRAMUSCULAR; INTRAVENOUS ONCE AS NEEDED
Status: ACTIVE | OUTPATIENT
Start: 2018-02-13 | End: 2018-02-13

## 2018-02-13 RX ORDER — CLINDAMYCIN PHOSPHATE 900 MG/50ML
INJECTION INTRAVENOUS
Status: DISCONTINUED | OUTPATIENT
Start: 2018-02-13 | End: 2018-02-13

## 2018-02-13 RX ORDER — DIPHENHYDRAMINE HYDROCHLORIDE 50 MG/ML
12.5 INJECTION INTRAMUSCULAR; INTRAVENOUS AS NEEDED
Status: DISCONTINUED | OUTPATIENT
Start: 2018-02-13 | End: 2018-02-13 | Stop reason: HOSPADM

## 2018-02-13 RX ORDER — PRAVASTATIN SODIUM 20 MG
20 TABLET ORAL NIGHTLY
COMMUNITY
End: 2018-07-16

## 2018-02-13 RX ORDER — METOCLOPRAMIDE HYDROCHLORIDE 5 MG/ML
10 INJECTION INTRAMUSCULAR; INTRAVENOUS EVERY 6 HOURS PRN
Status: DISPENSED | OUTPATIENT
Start: 2018-02-13 | End: 2018-02-15

## 2018-02-13 RX ORDER — SENNOSIDES 8.6 MG
17.2 TABLET ORAL NIGHTLY
Status: DISCONTINUED | OUTPATIENT
Start: 2018-02-13 | End: 2018-02-15

## 2018-02-13 RX ORDER — ONDANSETRON 2 MG/ML
4 INJECTION INTRAMUSCULAR; INTRAVENOUS AS NEEDED
Status: DISCONTINUED | OUTPATIENT
Start: 2018-02-13 | End: 2018-02-13 | Stop reason: HOSPADM

## 2018-02-13 RX ORDER — OXYCODONE HYDROCHLORIDE 5 MG/1
5 TABLET ORAL EVERY 4 HOURS PRN
Status: DISCONTINUED | OUTPATIENT
Start: 2018-02-13 | End: 2018-02-14

## 2018-02-13 RX ORDER — ACETAMINOPHEN 325 MG/1
650 TABLET ORAL 4 TIMES DAILY
Status: DISCONTINUED | OUTPATIENT
Start: 2018-02-13 | End: 2018-02-14

## 2018-02-13 RX ORDER — DEXTROSE AND SODIUM CHLORIDE 5; .45 G/100ML; G/100ML
INJECTION, SOLUTION INTRAVENOUS CONTINUOUS
Status: DISCONTINUED | OUTPATIENT
Start: 2018-02-13 | End: 2018-02-15

## 2018-02-13 RX ORDER — DIPHENHYDRAMINE HYDROCHLORIDE 50 MG/ML
12.5 INJECTION INTRAMUSCULAR; INTRAVENOUS EVERY 4 HOURS PRN
Status: DISCONTINUED | OUTPATIENT
Start: 2018-02-13 | End: 2018-02-15

## 2018-02-13 RX ORDER — ONDANSETRON 2 MG/ML
4 INJECTION INTRAMUSCULAR; INTRAVENOUS EVERY 4 HOURS PRN
Status: DISPENSED | OUTPATIENT
Start: 2018-02-13 | End: 2018-02-15

## 2018-02-13 RX ORDER — OXYCODONE HYDROCHLORIDE 15 MG/1
15 TABLET ORAL EVERY 4 HOURS PRN
Status: DISCONTINUED | OUTPATIENT
Start: 2018-02-13 | End: 2018-02-14

## 2018-02-13 RX ORDER — ACETAMINOPHEN 325 MG/1
TABLET ORAL
Status: COMPLETED
Start: 2018-02-13 | End: 2018-02-13

## 2018-02-13 RX ORDER — BUPIVACAINE HYDROCHLORIDE AND EPINEPHRINE 5; 5 MG/ML; UG/ML
INJECTION, SOLUTION EPIDURAL; INTRACAUDAL; PERINEURAL AS NEEDED
Status: DISCONTINUED | OUTPATIENT
Start: 2018-02-13 | End: 2018-02-13 | Stop reason: HOSPADM

## 2018-02-13 RX ORDER — MORPHINE SULFATE 4 MG/ML
4 INJECTION, SOLUTION INTRAMUSCULAR; INTRAVENOUS EVERY 2 HOUR PRN
Status: DISCONTINUED | OUTPATIENT
Start: 2018-02-13 | End: 2018-02-15

## 2018-02-13 RX ORDER — OXYCODONE HCL 10 MG/1
10 TABLET, FILM COATED, EXTENDED RELEASE ORAL
Status: COMPLETED | OUTPATIENT
Start: 2018-02-13 | End: 2018-02-13

## 2018-02-13 RX ORDER — SODIUM PHOSPHATE, DIBASIC AND SODIUM PHOSPHATE, MONOBASIC 7; 19 G/133ML; G/133ML
1 ENEMA RECTAL ONCE AS NEEDED
Status: DISCONTINUED | OUTPATIENT
Start: 2018-02-13 | End: 2018-02-15

## 2018-02-13 RX ORDER — CLINDAMYCIN PHOSPHATE 900 MG/50ML
900 INJECTION INTRAVENOUS ONCE
Status: DISCONTINUED | OUTPATIENT
Start: 2018-02-13 | End: 2018-02-13 | Stop reason: HOSPADM

## 2018-02-13 RX ORDER — MORPHINE SULFATE 4 MG/ML
2 INJECTION, SOLUTION INTRAMUSCULAR; INTRAVENOUS EVERY 2 HOUR PRN
Status: DISCONTINUED | OUTPATIENT
Start: 2018-02-13 | End: 2018-02-15

## 2018-02-13 RX ORDER — SODIUM CHLORIDE, SODIUM LACTATE, POTASSIUM CHLORIDE, CALCIUM CHLORIDE 600; 310; 30; 20 MG/100ML; MG/100ML; MG/100ML; MG/100ML
INJECTION, SOLUTION INTRAVENOUS CONTINUOUS
Status: DISCONTINUED | OUTPATIENT
Start: 2018-02-13 | End: 2018-02-15

## 2018-02-13 NOTE — PLAN OF CARE
Patient/Family Goals    • Patient/Family Long Term Goal Progressing    • Patient/Family Short Term Goal Progressing        S/p left total knee. Arrived to floor from PACU. Denies pain at this time. Ace wrap, ice wrap and aquacel to incision site.  Juan stanton

## 2018-02-13 NOTE — CONSULTS
[facility]  [address] Patient Status:  Inpatient    1950 MRN [id number]   [facility] 3SW-A Attending Gerhardt Norris, MD   Hosp Day # 0 PCP Nelli Sesay MD     Reason for consult: Medical management    Request (112.8 kg)   SpO2 97%   BMI 38.95 kg/m²   General: No acute distress. Alert and oriented x 3. HEENT: Normocephalic atraumatic. Moist mucous membranes. EOM-I. PERRLA. Anicteric. Neck: No lymphadenopathy. No JVD. No carotid bruits.   Respiratory: Clear to a

## 2018-02-13 NOTE — PHYSICAL THERAPY NOTE
PHYSICAL THERAPY KNEE EVALUATION - INPATIENT     Room Number: 382/382-A  Evaluation Date: 2/13/2018  Type of Evaluation: Initial  Physician Order: PT Eval and Treat    Presenting Problem: S/p Left TKA on 02/13/18  Reason for Therapy: Mobility Dysfunction a lower extremity           L Lower Extremity: Weight Bearing as Tolerated    PAIN ASSESSMENT  Ratin  Location: Left knee @ surgical site  Management Techniques: Activity promotion; Body mechanics;Breathing techniques;Relaxation;Repositioning    COGNITION (ft): 100 ft  Assistive Device: Rolling walker  Pattern: L Decreased stance time  Stoop/Curb Assistance: Not tested  Comment : Above score is based on FIM definations    Skilled Therapy Provided: Evaluation completed.  Patient was instructed & educated in w is below baseline and would benefit from skilled inpatient PT to address the above deficits to assist patient in returning to prior to level of function.   DISCHARGE RECOMMENDATIONS  PT Discharge Recommendations: Home with home health PTThe AM-PAC '6-Clicks

## 2018-02-13 NOTE — BRIEF OP NOTE
Pre-Operative Diagnosis: LEFT KNEE DEGENERATIVE JOINT DISEASE     Post-Operative Diagnosis: LEFT KNEE DEGENERATIVE JOINT DISEASE     Procedure Performed:   Procedure(s):  LEFT TOTAL KNEE ARTHROPLASTY    Surgeon(s) and Role:     Karina Longo MD - Pr

## 2018-02-13 NOTE — OPERATIVE REPORT
[facility]    PATIENT'S NAME: Catarino Yanet   ATTENDING PHYSICIAN: Luis Reyes M.D. OPERATING PHYSICIAN: Luis Reyes M.D.    PATIENT ACCOUNT#:   [de-identified]    LOCATION:  PACU [facility] PACU 8 EDWP 10  MEDICAL RECORD #:   [id number]       DATE OF repositioned to the supine position on the operating room table, being sure to well pad all bony prominences. An adductor canal block was performed by the anesthesiologist without apparent difficulty.   The left lower extremity was prepped and draped in th stability, range of motion, and patellar tracking. Trial articular insert was removed. The wound was copiously irrigated and dried. Capsule and surrounding soft tissues were infused with local anesthetic.   The actual Arturo Persona left 10 mm medial con

## 2018-02-13 NOTE — H&P (VIEW-ONLY)
Eugene Hager is a 78year old female. Patient presents with:  Pre-Op Exam: total knee replacement. . room 5      HPI:   PREOP EXAM      PRE-OP Physical  What is the full name of procedure/ surgery? LEFT TOTAL KNEE REPLACEMENT  Date being surgery or proced Hyperpiesia 5/11/2015   • MI (myocardial infarction) 2000   • Obesity (BMI 30-39. 9) 5/11/2015   • Visual impairment     glasses for reading      Social History:  Smoking status: Former Smoker                                                              Pac REFLEX; Future  -     CARDIO - EXTERNAL    Primary osteoarthritis of right knee    Essential hypertension  -     ELECTROCARDIOGRAM, COMPLETE  -     CARDIO - EXTERNAL    S/P PTCA (percutaneous transluminal coronary angioplasty)  -     ELECTROCARDIOGRAM, COM

## 2018-02-13 NOTE — ANESTHESIA PREPROCEDURE EVALUATION
PRE-OP EVALUATION    Patient Name: Man Sorto    Pre-op Diagnosis: LEFT KNEE DEGENERATIVE JOINT DISEASE    Procedure(s):  LEFT TOTAL KNEE ARTHROPLASTY    Surgeon(s) and Role:     Ayaka Muhammad MD - Primary    Pre-op vitals reviewed.   Temp: 98.5 °F Alcohol use No       Drug use: No     Available pre-op labs reviewed.     Lab Results  Component Value Date   WBC 7.5 01/22/2018   RBC 4.44 01/22/2018   HGB 12.1 01/22/2018   HCT 38.6 01/22/2018   MCV 86.9 01/22/2018   MCH 27.3 01/22/2018   MCHC 31.3 01/

## 2018-02-13 NOTE — ANESTHESIA POSTPROCEDURE EVALUATION
[address] Patient Status:  Surgery Admit   Age/Gender 78year old female MRN [id number]   [facility] SURGERY Attending Pau Calabrese MD   Hosp Day # 0 PCP Silas Hough MD       Anesthesia Post-op Note    Procedure(s)

## 2018-02-13 NOTE — INTERVAL H&P NOTE
Pre-op Diagnosis: LEFT KNEE DEGENERATIVE JOINT DISEASE    The above referenced H&P was reviewed by Alesha Roca MD on 2/13/2018, the patient was examined and no significant changes have occurred in the patient's condition since the H&P was performed.   I

## 2018-02-14 LAB
ERYTHROCYTE [DISTWIDTH] IN BLOOD BY AUTOMATED COUNT: 13.5 % (ref 11.5–16)
HCT VFR BLD AUTO: 29.6 % (ref 34–50)
HGB BLD-MCNC: 9.6 G/DL (ref 12–16)
MCH RBC QN AUTO: 27.6 PG (ref 27–33.2)
MCHC RBC AUTO-ENTMCNC: 32.4 G/DL (ref 31–37)
MCV RBC AUTO: 85.1 FL (ref 81–100)
PLATELET # BLD AUTO: 196 10(3)UL (ref 150–450)
RBC # BLD AUTO: 3.48 X10(6)UL (ref 3.8–5.1)
RED CELL DISTRIBUTION WIDTH-SD: 41.9 FL (ref 35.1–46.3)
WBC # BLD AUTO: 11.6 X10(3) UL (ref 4–13)

## 2018-02-14 PROCEDURE — 99232 SBSQ HOSP IP/OBS MODERATE 35: CPT | Performed by: HOSPITALIST

## 2018-02-14 RX ORDER — LISINOPRIL 20 MG/1
20 TABLET ORAL DAILY
Status: DISCONTINUED | OUTPATIENT
Start: 2018-02-14 | End: 2018-02-15

## 2018-02-14 RX ORDER — KETOROLAC TROMETHAMINE 30 MG/ML
15 INJECTION, SOLUTION INTRAMUSCULAR; INTRAVENOUS ONCE
Status: COMPLETED | OUTPATIENT
Start: 2018-02-14 | End: 2018-02-14

## 2018-02-14 RX ORDER — CELECOXIB 200 MG/1
200 CAPSULE ORAL DAILY
Qty: 30 CAPSULE | Refills: 1 | Status: SHIPPED | OUTPATIENT
Start: 2018-02-14 | End: 2018-04-15

## 2018-02-14 RX ORDER — HYDROCODONE BITARTRATE AND ACETAMINOPHEN 7.5; 325 MG/1; MG/1
1-2 TABLET ORAL EVERY 4 HOURS PRN
Qty: 75 TABLET | Refills: 0 | Status: SHIPPED | OUTPATIENT
Start: 2018-02-14 | End: 2018-04-10 | Stop reason: ALTCHOICE

## 2018-02-14 RX ORDER — AMOXICILLIN 250 MG
2 CAPSULE ORAL 2 TIMES DAILY
Qty: 120 TABLET | Refills: 0 | Status: SHIPPED | OUTPATIENT
Start: 2018-02-14 | End: 2018-04-10 | Stop reason: ALTCHOICE

## 2018-02-14 RX ORDER — HYDROCODONE BITARTRATE AND ACETAMINOPHEN 10; 325 MG/1; MG/1
2 TABLET ORAL EVERY 4 HOURS PRN
Status: DISCONTINUED | OUTPATIENT
Start: 2018-02-14 | End: 2018-02-15

## 2018-02-14 RX ORDER — HYDROCODONE BITARTRATE AND ACETAMINOPHEN 10; 325 MG/1; MG/1
1 TABLET ORAL EVERY 4 HOURS PRN
Status: DISCONTINUED | OUTPATIENT
Start: 2018-02-14 | End: 2018-02-15

## 2018-02-14 RX ORDER — SCOLOPAMINE TRANSDERMAL SYSTEM 1 MG/1
1 PATCH, EXTENDED RELEASE TRANSDERMAL
Status: DISCONTINUED | OUTPATIENT
Start: 2018-02-14 | End: 2018-02-15

## 2018-02-14 RX ORDER — ONDANSETRON 4 MG/1
4 TABLET, ORALLY DISINTEGRATING ORAL EVERY 8 HOURS PRN
Qty: 20 TABLET | Refills: 1 | Status: SHIPPED | OUTPATIENT
Start: 2018-02-14 | End: 2018-04-10 | Stop reason: ALTCHOICE

## 2018-02-14 RX ORDER — HYDROCODONE BITARTRATE AND ACETAMINOPHEN 5; 325 MG/1; MG/1
1-2 TABLET ORAL EVERY 4 HOURS PRN
Qty: 50 TABLET | Refills: 0 | Status: SHIPPED | OUTPATIENT
Start: 2018-02-14 | End: 2018-04-10 | Stop reason: ALTCHOICE

## 2018-02-14 NOTE — DISCHARGE SUMMARY
[facility]  Discharge Summary    Luna Fontana Patient Status:  Inpatient    1950 MRN [id number]   [facility] 3SW-A Attending Chilango Angela MD   [facility] Day [facility] PCP Vera Blanco MD     Date of Admission: 2018 Disposition: Fin 0    Sennosides-Docusate Sodium (SENNA S) 8.6-50 MG Oral Tab  Take 2 tablets by mouth 2 (two) times daily.  As needed for constipation  Qty: 120 tablet Refills: 0    ondansetron (ZOFRAN ODT) 4 MG Oral Tablet Dispersible  Take 1 tablet (4 mg total) by mouth

## 2018-02-14 NOTE — PLAN OF CARE
PAIN - ADULT    • Verbalizes/displays adequate comfort level or patient's stated pain goal Not Progressing        ALERT ,AWAKE,ORIENTED X4 PLAN D/C TODAY IF PAIN CONTROL.  CALL LIGHT W/IN REACH TO CALL IF NEEDS ASSISTANCE

## 2018-02-14 NOTE — PROGRESS NOTES
[facility]  Progress Note     Bren Meth Patient Status:  Inpatient    1950 MRN [id number]   [facility] 3SW-A Attending Allie Jose MD   Hosp Day # 1 PCP Rancho Grimes MD     Chief Complaint: Medical management    S: Tavon Layne above    Quality:  · DVT Prophylaxis: Xarelto  · CODE status: Full Code  · Smalls: None  · Central line: None    Estimated date of discharge: 0-1 days  Discharge is dependent on: Progress  At this point Ms. Efren Moran is expected to be discharge to: TBD    Plan

## 2018-02-14 NOTE — OCCUPATIONAL THERAPY NOTE
OCCUPATIONAL THERAPY QUICK EVALUATION - INPATIENT    Room Number: 382/382-A  Evaluation Date: 2/14/2018     Type of Evaluation: Initial  Presenting Problem: Left Total Knee    Physician Order: IP Consult to Occupational Therapy  Reason for Therapy:  ADL/IA Extremity: Weight Bearing as Tolerated    PAIN ASSESSMENT  Rating: 3  Location: knee  Management Techniques: Activity promotion; Body mechanics;Breathing techniques;Relaxation;Repositioning; Other (Comment) (Ice)    COGNITION  WNL    RANGE OF MOTION AND STRE presents with the following performance deficits: endurance. These deficits impact the patient’s ability to participate in ADLs, instrumental activities of daily living, rest and sleep, work, leisure and social participation.      Patient Complexity  Delilah Wilson

## 2018-02-14 NOTE — CM/SW NOTE
02/14/18 1000   CM/SW Referral Data   Referral Source Physician   Reason for Referral Discharge planning   Informant Patient;Edward Staff   Pertinent Medical Hx   Primary Care Physician Name Dena Guzman MD   Patient Info   Patient's Mental Status Alert;O

## 2018-02-14 NOTE — PHYSICAL THERAPY NOTE
PHYSICAL THERAPY KNEE TREATMENT NOTE - INPATIENT     Room Number: 382/382-A     Session: 1&2   Number of Visits to Meet Established Goals: 3    Presenting Problem: S/p Left TKA on 02/13/18    Problem List  Active Problems:    Arthritis, degenerative    Fa of the bed?: None   How much help from another person does the patient currently need. ..   -   Moving to and from a bed to a chair (including a wheelchair)?: None   -   Need to walk in hospital room?: A Little   -   Climbing 3-5 steps with a railing?: Giles Aguilar pt is not ready to be dc today as she hasn't done stair training and car transfer training. RN notified.        Exercises AM Session PM Session   Ankle Pumps 10 reps 15 reps   Quad Sets 10 reps 15 reps   Glut Sets 10 reps 15 reps   Hip Abd/Add 10 reps 15 re training;Neuromuscular re-educate;Range of motion;Strengthening;Stoop training;Stair training;Transfer training;Balance training  Rehab Potential : Good  Frequency (Obs): BID    CURRENT GOALS    Goal #1  Patient is able to demonstrate supine - sit EOB @ le

## 2018-02-14 NOTE — PLAN OF CARE
RECD ALERT ,AWAKE, ORIENTED X4.PT HAD RADHA DRAIN. RECD REPORT TRANSFUSED BLOOD IF DRAIN >  200CC. WILL CLARIFY ORDER POD #1. ORDER CLARIFY WITH SALMA EATON. DISCARD DRAIN . KEEP DRAIN IF > 50CC FOR 8 HRS KEEP IT. CONVERT TO HEMOVAC TO GO HOME WITH DRAIN.

## 2018-02-14 NOTE — PROGRESS NOTES
only attended group discharge education class due to patient being nauseated. Discharge education provided utilizing \"hip/knee replacement discharge instructions\" sheet. Teach back done. Questions solicited and answered.

## 2018-02-15 ENCOUNTER — PATIENT OUTREACH (OUTPATIENT)
Dept: FAMILY MEDICINE CLINIC | Facility: CLINIC | Age: 68
End: 2018-02-15

## 2018-02-15 VITALS
WEIGHT: 248.69 LBS | DIASTOLIC BLOOD PRESSURE: 47 MMHG | RESPIRATION RATE: 16 BRPM | TEMPERATURE: 98 F | BODY MASS INDEX: 39.03 KG/M2 | OXYGEN SATURATION: 94 % | HEIGHT: 67 IN | HEART RATE: 79 BPM | SYSTOLIC BLOOD PRESSURE: 123 MMHG

## 2018-02-15 LAB
ERYTHROCYTE [DISTWIDTH] IN BLOOD BY AUTOMATED COUNT: 14.1 % (ref 11.5–16)
HCT VFR BLD AUTO: 27.2 % (ref 34–50)
HGB BLD-MCNC: 8.6 G/DL (ref 12–16)
MCH RBC QN AUTO: 27.6 PG (ref 27–33.2)
MCHC RBC AUTO-ENTMCNC: 31.6 G/DL (ref 31–37)
MCV RBC AUTO: 87.2 FL (ref 81–100)
PLATELET # BLD AUTO: 181 10(3)UL (ref 150–450)
RBC # BLD AUTO: 3.12 X10(6)UL (ref 3.8–5.1)
RED CELL DISTRIBUTION WIDTH-SD: 44.4 FL (ref 35.1–46.3)
WBC # BLD AUTO: 10.6 X10(3) UL (ref 4–13)

## 2018-02-15 PROCEDURE — 99232 SBSQ HOSP IP/OBS MODERATE 35: CPT | Performed by: HOSPITALIST

## 2018-02-15 NOTE — PROGRESS NOTES
Acute Pain Service    Post Op Day 2 Ortho Note    Assessed patient in chair. Patient rates pain 0/10 at rest and increases to moderate but tolerable with activity. States pain is much better today and nausea resolved. T Patient states Nohemi Jaycob is working well

## 2018-02-15 NOTE — PROGRESS NOTES
[address] Patient Status:  Inpatient    1950 MRN [id number]   [facility] 3SW-A Attending Benito Richardson MD   Hosp Day # 2 PCP Chris Appiah MD     Subjective:  Post-Operative Day: 2 Status Post left Total Knee A

## 2018-02-15 NOTE — PLAN OF CARE
DISCHARGE PLANNING    • Discharge to home or other facility with appropriate resources Adequate for Discharge        RISK FOR INFECTION - ADULT    • Absence of fever/infection during anticipated neutropenic period Adequate for Discharge        SKIN/TISSUE

## 2018-02-15 NOTE — PHYSICAL THERAPY NOTE
PHYSICAL THERAPY KNEE TREATMENT NOTE - INPATIENT     Room Number: 382/382-A     Session: 3   Number of Visits to Meet Established Goals: 3    Presenting Problem: S/p Left TKA on 02/13/18    Problem List  Active Problems:    Arthritis, degenerative    Esse patient currently need. ..   -   Moving to and from a bed to a chair (including a wheelchair)?: None   -   Need to walk in hospital room?: A Little   -   Climbing 3-5 steps with a railing?: A Little       AM-PAC Score:  Raw Score: 22   PT Approx Degree of I met;Call light within reach;RN aware of session/findings; All patient questions and concerns addressed    ASSESSMENT   Pt seen in PT group for gait training, stair/curb step training, and BLE strengthening: S/P LTKA.     Results of the AM-PAC \"6 clicks\" In

## 2018-02-15 NOTE — PROGRESS NOTES
[facility]  Progress Note     Katinaanthony Hernandez Patient Status:  Inpatient    1950 MRN [id number]   [facility] 3SW-A Attending Yulinaa Jarvis MD   Hosp Day # 2 PCP Rachael Welsh MD     Chief Complaint: Medical management    S: Lyndsey Gravse #2  2. HTN:  Resume BP meds  3. HL    Plan of care: OK for discharge from a medicine perspective.     Quality:  · DVT Prophylaxis: Xarelto  · CODE status: fULL  · Smalls: n/a  · Central line: n/a    Estimated date of discharge: tODAY  Discharge is dependent

## 2018-02-15 NOTE — CM/SW NOTE
02/15/18 1612   Discharge disposition   Discharged to: Home-Health   Name of Facillity/Home Care/Hospice ATI   Discharge transportation Private car

## 2018-02-15 NOTE — DISCHARGE SUMMARY
[facility]  Discharge Summary    Carolynn Pryor Patient Status:  Inpatient    1950 MRN [id number]   [facility] 3SW-A Attending Rand Ramírez MD   [facility] Day # 2 PCP Paula Sánchez MD     Date of Admission: 2018 Disposition: Malik Cough 0    Sennosides-Docusate Sodium (SENNA S) 8.6-50 MG Oral Tab  Take 2 tablets by mouth 2 (two) times daily.  As needed for constipation  Qty: 120 tablet Refills: 0    ondansetron (ZOFRAN ODT) 4 MG Oral Tablet Dispersible  Take 1 tablet (4 mg total) by mouth

## 2018-03-05 ENCOUNTER — TELEPHONE (OUTPATIENT)
Dept: FAMILY MEDICINE CLINIC | Facility: CLINIC | Age: 68
End: 2018-03-05

## 2018-03-05 DIAGNOSIS — I10 ESSENTIAL HYPERTENSION: ICD-10-CM

## 2018-03-05 RX ORDER — LISINOPRIL AND HYDROCHLOROTHIAZIDE 20; 12.5 MG/1; MG/1
1 TABLET ORAL DAILY
Qty: 90 TABLET | Refills: 0 | Status: SHIPPED | OUTPATIENT
Start: 2018-03-05 | End: 2018-06-05

## 2018-03-05 RX ORDER — PRAVASTATIN SODIUM 20 MG
TABLET ORAL
Qty: 90 TABLET | Refills: 0 | Status: SHIPPED | OUTPATIENT
Start: 2018-03-05 | End: 2018-04-10

## 2018-03-05 NOTE — TELEPHONE ENCOUNTER
Last OV: 1/22/2018  Last labs: 9/12/2017  Pravastatin: 12/5/2017 #90 no RF  Lisinopril: 12/5/2017 #90 no RF

## 2018-03-06 ENCOUNTER — MED REC SCAN ONLY (OUTPATIENT)
Dept: FAMILY MEDICINE CLINIC | Facility: CLINIC | Age: 68
End: 2018-03-06

## 2018-04-06 ENCOUNTER — MED REC SCAN ONLY (OUTPATIENT)
Dept: FAMILY MEDICINE CLINIC | Facility: CLINIC | Age: 68
End: 2018-04-06

## 2018-04-10 ENCOUNTER — LABORATORY ENCOUNTER (OUTPATIENT)
Dept: LAB | Age: 68
End: 2018-04-10
Attending: FAMILY MEDICINE
Payer: MEDICARE

## 2018-04-10 DIAGNOSIS — Z01.818 PRE-OP TESTING: ICD-10-CM

## 2018-04-10 DIAGNOSIS — E78.2 MIXED HYPERLIPIDEMIA: ICD-10-CM

## 2018-04-10 PROCEDURE — 87081 CULTURE SCREEN ONLY: CPT

## 2018-04-10 PROCEDURE — 80048 BASIC METABOLIC PNL TOTAL CA: CPT

## 2018-04-10 PROCEDURE — 87147 CULTURE TYPE IMMUNOLOGIC: CPT

## 2018-04-10 PROCEDURE — 83721 ASSAY OF BLOOD LIPOPROTEIN: CPT

## 2018-04-10 PROCEDURE — 36415 COLL VENOUS BLD VENIPUNCTURE: CPT

## 2018-04-10 PROCEDURE — 80061 LIPID PANEL: CPT

## 2018-04-10 PROCEDURE — 85025 COMPLETE CBC W/AUTO DIFF WBC: CPT

## 2018-04-10 RX ORDER — MELOXICAM 15 MG/1
15 TABLET ORAL DAILY
Status: ON HOLD | COMMUNITY
End: 2018-04-25

## 2018-04-11 ENCOUNTER — OFFICE VISIT (OUTPATIENT)
Dept: FAMILY MEDICINE CLINIC | Facility: CLINIC | Age: 68
End: 2018-04-11

## 2018-04-11 VITALS
HEIGHT: 66.25 IN | HEART RATE: 100 BPM | SYSTOLIC BLOOD PRESSURE: 110 MMHG | BODY MASS INDEX: 38.47 KG/M2 | DIASTOLIC BLOOD PRESSURE: 62 MMHG | RESPIRATION RATE: 20 BRPM | TEMPERATURE: 98 F | WEIGHT: 239.38 LBS

## 2018-04-11 DIAGNOSIS — Z01.818 PREPROCEDURAL EXAMINATION: Primary | ICD-10-CM

## 2018-04-11 DIAGNOSIS — M17.11 PRIMARY OSTEOARTHRITIS OF RIGHT KNEE: ICD-10-CM

## 2018-04-11 PROCEDURE — 99215 OFFICE O/P EST HI 40 MIN: CPT | Performed by: FAMILY MEDICINE

## 2018-04-11 NOTE — H&P
Erica Bell is a 78year old female. Patient presents with:  [facility] Ex[facility]       HPI:   PREOP EXAM      PRE-OP Physical  What is the full name of procedure/ surgery? RIGHT TOTAL KNEE ARTHROPLASTY  Date bein degenerative 5/11/2015   • Coronary atherosclerosis    • Encounter for long-term (current) use of other medications 5/11/2015   • Essential hypertension 9/21/2015   • Heart attack ([address] 2000    Mild    • High blood pressure    • High cholesterol    • Hyper 5.4   Eosinophils %      % 2.4   Basophils %      % 0.9   Immature Granulocyte %      % 0.4   Glucose      70 - 99 mg/dL 123 (H)   BUN      8 - 20 mg/dL 34 (H)   CREATININE      0.55 - 1.02 mg/dL 1.46 (H)   GFR, Non-      >=60 37 (L)   GFR,

## 2018-04-15 ENCOUNTER — ANESTHESIA EVENT (OUTPATIENT)
Dept: SURGERY | Facility: HOSPITAL | Age: 68
DRG: 470 | End: 2018-04-15
Payer: MEDICARE

## 2018-04-17 ENCOUNTER — APPOINTMENT (OUTPATIENT)
Dept: LAB | Age: 68
End: 2018-04-17
Attending: FAMILY MEDICINE
Payer: MEDICARE

## 2018-04-17 ENCOUNTER — TELEPHONE (OUTPATIENT)
Dept: FAMILY MEDICINE CLINIC | Facility: CLINIC | Age: 68
End: 2018-04-17

## 2018-04-17 DIAGNOSIS — Z01.818 PRE-OP TESTING: ICD-10-CM

## 2018-04-17 DIAGNOSIS — Z22.322 POSITIVE RESULT FOR METHICILLIN RESISTANT STAPHYLOCOCCUS AUREUS (MRSA) SCREENING: ICD-10-CM

## 2018-04-17 PROCEDURE — 87081 CULTURE SCREEN ONLY: CPT

## 2018-04-24 ENCOUNTER — ANESTHESIA (OUTPATIENT)
Dept: SURGERY | Facility: HOSPITAL | Age: 68
DRG: 470 | End: 2018-04-24
Payer: MEDICARE

## 2018-04-24 ENCOUNTER — HOSPITAL ENCOUNTER (INPATIENT)
Facility: HOSPITAL | Age: 68
LOS: 1 days | Discharge: HOME HEALTH | DRG: 470 | End: 2018-04-25
Attending: ORTHOPAEDIC SURGERY | Admitting: ORTHOPAEDIC SURGERY
Payer: MEDICARE

## 2018-04-24 ENCOUNTER — APPOINTMENT (OUTPATIENT)
Dept: GENERAL RADIOLOGY | Facility: HOSPITAL | Age: 68
DRG: 470 | End: 2018-04-24
Attending: PHYSICIAN ASSISTANT
Payer: MEDICARE

## 2018-04-24 ENCOUNTER — SURGERY (OUTPATIENT)
Age: 68
End: 2018-04-24

## 2018-04-24 DIAGNOSIS — M17.11 RIGHT KNEE DJD: Primary | ICD-10-CM

## 2018-04-24 PROCEDURE — 73560 X-RAY EXAM OF KNEE 1 OR 2: CPT | Performed by: PHYSICIAN ASSISTANT

## 2018-04-24 PROCEDURE — 99222 1ST HOSP IP/OBS MODERATE 55: CPT | Performed by: HOSPITALIST

## 2018-04-24 PROCEDURE — 3E0T3BZ INTRODUCTION OF ANESTHETIC AGENT INTO PERIPHERAL NERVES AND PLEXI, PERCUTANEOUS APPROACH: ICD-10-PCS | Performed by: ANESTHESIOLOGY

## 2018-04-24 PROCEDURE — 0SRC0J9 REPLACEMENT OF RIGHT KNEE JOINT WITH SYNTHETIC SUBSTITUTE, CEMENTED, OPEN APPROACH: ICD-10-PCS | Performed by: ORTHOPAEDIC SURGERY

## 2018-04-24 DEVICE — PSN ALL POLY PAT PLY 32MM: Type: IMPLANTABLE DEVICE | Site: KNEE | Status: FUNCTIONAL

## 2018-04-24 DEVICE — PSN TIB STM 5 DEG SZ E R: Type: IMPLANTABLE DEVICE | Site: KNEE | Status: FUNCTIONAL

## 2018-04-24 DEVICE — CEMENT BONE ZIM PALICOS R +G: Type: IMPLANTABLE DEVICE | Site: KNEE | Status: FUNCTIONAL

## 2018-04-24 DEVICE — PSN FEM CR CMT CCR STD SZ8 R: Type: IMPLANTABLE DEVICE | Site: KNEE | Status: FUNCTIONAL

## 2018-04-24 RX ORDER — ACETAMINOPHEN 325 MG/1
TABLET ORAL
Status: COMPLETED
Start: 2018-04-24 | End: 2018-04-24

## 2018-04-24 RX ORDER — SODIUM PHOSPHATE, DIBASIC AND SODIUM PHOSPHATE, MONOBASIC 7; 19 G/133ML; G/133ML
1 ENEMA RECTAL ONCE AS NEEDED
Status: DISCONTINUED | OUTPATIENT
Start: 2018-04-24 | End: 2018-04-25

## 2018-04-24 RX ORDER — HYDROMORPHONE HYDROCHLORIDE 1 MG/ML
0.4 INJECTION, SOLUTION INTRAMUSCULAR; INTRAVENOUS; SUBCUTANEOUS EVERY 2 HOUR PRN
Status: DISCONTINUED | OUTPATIENT
Start: 2018-04-24 | End: 2018-04-25

## 2018-04-24 RX ORDER — ACETAMINOPHEN 325 MG/1
650 TABLET ORAL ONCE
Status: COMPLETED | OUTPATIENT
Start: 2018-04-24 | End: 2018-04-24

## 2018-04-24 RX ORDER — OXYCODONE HYDROCHLORIDE 10 MG/1
10 TABLET ORAL EVERY 4 HOURS PRN
Status: DISCONTINUED | OUTPATIENT
Start: 2018-04-24 | End: 2018-04-25

## 2018-04-24 RX ORDER — MEPERIDINE HYDROCHLORIDE 25 MG/ML
12.5 INJECTION INTRAMUSCULAR; INTRAVENOUS; SUBCUTANEOUS AS NEEDED
Status: DISCONTINUED | OUTPATIENT
Start: 2018-04-24 | End: 2018-04-24 | Stop reason: HOSPADM

## 2018-04-24 RX ORDER — SODIUM CHLORIDE, SODIUM LACTATE, POTASSIUM CHLORIDE, CALCIUM CHLORIDE 600; 310; 30; 20 MG/100ML; MG/100ML; MG/100ML; MG/100ML
INJECTION, SOLUTION INTRAVENOUS CONTINUOUS
Status: DISCONTINUED | OUTPATIENT
Start: 2018-04-24 | End: 2018-04-25

## 2018-04-24 RX ORDER — HYDROMORPHONE HYDROCHLORIDE 1 MG/ML
0.8 INJECTION, SOLUTION INTRAMUSCULAR; INTRAVENOUS; SUBCUTANEOUS EVERY 2 HOUR PRN
Status: DISCONTINUED | OUTPATIENT
Start: 2018-04-24 | End: 2018-04-25

## 2018-04-24 RX ORDER — ACETAMINOPHEN 500 MG
1000 TABLET ORAL ONCE
Status: DISCONTINUED | OUTPATIENT
Start: 2018-04-24 | End: 2018-04-25

## 2018-04-24 RX ORDER — HYDROMORPHONE HYDROCHLORIDE 1 MG/ML
0.4 INJECTION, SOLUTION INTRAMUSCULAR; INTRAVENOUS; SUBCUTANEOUS EVERY 5 MIN PRN
Status: DISCONTINUED | OUTPATIENT
Start: 2018-04-24 | End: 2018-04-24 | Stop reason: HOSPADM

## 2018-04-24 RX ORDER — CLINDAMYCIN PHOSPHATE 900 MG/50ML
900 INJECTION INTRAVENOUS ONCE
Status: DISCONTINUED | OUTPATIENT
Start: 2018-04-24 | End: 2018-04-24 | Stop reason: HOSPADM

## 2018-04-24 RX ORDER — ONDANSETRON 2 MG/ML
4 INJECTION INTRAMUSCULAR; INTRAVENOUS AS NEEDED
Status: DISCONTINUED | OUTPATIENT
Start: 2018-04-24 | End: 2018-04-24 | Stop reason: HOSPADM

## 2018-04-24 RX ORDER — BUPIVACAINE HYDROCHLORIDE AND EPINEPHRINE 5; 5 MG/ML; UG/ML
INJECTION, SOLUTION EPIDURAL; INTRACAUDAL; PERINEURAL AS NEEDED
Status: DISCONTINUED | OUTPATIENT
Start: 2018-04-24 | End: 2018-04-24 | Stop reason: HOSPADM

## 2018-04-24 RX ORDER — MIDAZOLAM HYDROCHLORIDE 1 MG/ML
1 INJECTION INTRAMUSCULAR; INTRAVENOUS EVERY 5 MIN PRN
Status: DISCONTINUED | OUTPATIENT
Start: 2018-04-24 | End: 2018-04-24 | Stop reason: HOSPADM

## 2018-04-24 RX ORDER — HYDROMORPHONE HYDROCHLORIDE 1 MG/ML
0.2 INJECTION, SOLUTION INTRAMUSCULAR; INTRAVENOUS; SUBCUTANEOUS EVERY 2 HOUR PRN
Status: DISCONTINUED | OUTPATIENT
Start: 2018-04-24 | End: 2018-04-25

## 2018-04-24 RX ORDER — MELATONIN
325
Status: DISCONTINUED | OUTPATIENT
Start: 2018-04-25 | End: 2018-04-25

## 2018-04-24 RX ORDER — OXYCODONE HCL 10 MG/1
10 TABLET, FILM COATED, EXTENDED RELEASE ORAL
Status: DISCONTINUED | OUTPATIENT
Start: 2018-04-24 | End: 2018-04-25

## 2018-04-24 RX ORDER — NALOXONE HYDROCHLORIDE 0.4 MG/ML
80 INJECTION, SOLUTION INTRAMUSCULAR; INTRAVENOUS; SUBCUTANEOUS AS NEEDED
Status: DISCONTINUED | OUTPATIENT
Start: 2018-04-24 | End: 2018-04-24 | Stop reason: HOSPADM

## 2018-04-24 RX ORDER — OXYCODONE HYDROCHLORIDE 5 MG/1
5 TABLET ORAL EVERY 4 HOURS PRN
Status: DISCONTINUED | OUTPATIENT
Start: 2018-04-24 | End: 2018-04-25

## 2018-04-24 RX ORDER — ACETAMINOPHEN 325 MG/1
650 TABLET ORAL 4 TIMES DAILY
Status: DISCONTINUED | OUTPATIENT
Start: 2018-04-24 | End: 2018-04-25

## 2018-04-24 RX ORDER — DIPHENHYDRAMINE HYDROCHLORIDE 50 MG/ML
12.5 INJECTION INTRAMUSCULAR; INTRAVENOUS EVERY 4 HOURS PRN
Status: DISCONTINUED | OUTPATIENT
Start: 2018-04-24 | End: 2018-04-25

## 2018-04-24 RX ORDER — DOCUSATE SODIUM 100 MG/1
100 CAPSULE, LIQUID FILLED ORAL 2 TIMES DAILY
Status: DISCONTINUED | OUTPATIENT
Start: 2018-04-24 | End: 2018-04-25

## 2018-04-24 RX ORDER — DIPHENHYDRAMINE HCL 25 MG
25 CAPSULE ORAL EVERY 4 HOURS PRN
Status: DISCONTINUED | OUTPATIENT
Start: 2018-04-24 | End: 2018-04-25

## 2018-04-24 RX ORDER — CLINDAMYCIN PHOSPHATE 900 MG/50ML
900 INJECTION INTRAVENOUS EVERY 8 HOURS
Status: COMPLETED | OUTPATIENT
Start: 2018-04-24 | End: 2018-04-25

## 2018-04-24 RX ORDER — BISACODYL 10 MG
10 SUPPOSITORY, RECTAL RECTAL
Status: DISCONTINUED | OUTPATIENT
Start: 2018-04-24 | End: 2018-04-25

## 2018-04-24 RX ORDER — DEXAMETHASONE SODIUM PHOSPHATE 4 MG/ML
4 VIAL (ML) INJECTION AS NEEDED
Status: DISCONTINUED | OUTPATIENT
Start: 2018-04-24 | End: 2018-04-24 | Stop reason: HOSPADM

## 2018-04-24 RX ORDER — TIZANIDINE 2 MG/1
2 TABLET ORAL 3 TIMES DAILY PRN
Status: DISCONTINUED | OUTPATIENT
Start: 2018-04-24 | End: 2018-04-25

## 2018-04-24 RX ORDER — KETOROLAC TROMETHAMINE 30 MG/ML
30 INJECTION, SOLUTION INTRAMUSCULAR; INTRAVENOUS EVERY 6 HOURS
Status: DISCONTINUED | OUTPATIENT
Start: 2018-04-24 | End: 2018-04-24

## 2018-04-24 RX ORDER — ACETAMINOPHEN 500 MG
1000 TABLET ORAL ONCE
Status: ON HOLD | COMMUNITY
End: 2018-04-25

## 2018-04-24 RX ORDER — DEXTROSE AND SODIUM CHLORIDE 5; .45 G/100ML; G/100ML
INJECTION, SOLUTION INTRAVENOUS CONTINUOUS
Status: DISCONTINUED | OUTPATIENT
Start: 2018-04-24 | End: 2018-04-25

## 2018-04-24 RX ORDER — DIPHENHYDRAMINE HYDROCHLORIDE 50 MG/ML
25 INJECTION INTRAMUSCULAR; INTRAVENOUS ONCE AS NEEDED
Status: ACTIVE | OUTPATIENT
Start: 2018-04-24 | End: 2018-04-24

## 2018-04-24 RX ORDER — OXYCODONE HYDROCHLORIDE 15 MG/1
15 TABLET ORAL EVERY 4 HOURS PRN
Status: DISCONTINUED | OUTPATIENT
Start: 2018-04-24 | End: 2018-04-25

## 2018-04-24 RX ORDER — POLYETHYLENE GLYCOL 3350 17 G/17G
17 POWDER, FOR SOLUTION ORAL DAILY PRN
Status: DISCONTINUED | OUTPATIENT
Start: 2018-04-24 | End: 2018-04-25

## 2018-04-24 RX ORDER — METOCLOPRAMIDE HYDROCHLORIDE 5 MG/ML
10 INJECTION INTRAMUSCULAR; INTRAVENOUS EVERY 6 HOURS PRN
Status: DISCONTINUED | OUTPATIENT
Start: 2018-04-24 | End: 2018-04-24

## 2018-04-24 RX ORDER — SENNOSIDES 8.6 MG
17.2 TABLET ORAL NIGHTLY
Status: DISCONTINUED | OUTPATIENT
Start: 2018-04-24 | End: 2018-04-25

## 2018-04-24 RX ORDER — SODIUM CHLORIDE, SODIUM LACTATE, POTASSIUM CHLORIDE, CALCIUM CHLORIDE 600; 310; 30; 20 MG/100ML; MG/100ML; MG/100ML; MG/100ML
INJECTION, SOLUTION INTRAVENOUS CONTINUOUS
Status: DISCONTINUED | OUTPATIENT
Start: 2018-04-24 | End: 2018-04-24 | Stop reason: HOSPADM

## 2018-04-24 RX ORDER — KETOROLAC TROMETHAMINE 30 MG/ML
15 INJECTION, SOLUTION INTRAMUSCULAR; INTRAVENOUS EVERY 6 HOURS
Status: DISPENSED | OUTPATIENT
Start: 2018-04-24 | End: 2018-04-25

## 2018-04-24 RX ORDER — METOCLOPRAMIDE HYDROCHLORIDE 5 MG/ML
5 INJECTION INTRAMUSCULAR; INTRAVENOUS EVERY 6 HOURS PRN
Status: DISCONTINUED | OUTPATIENT
Start: 2018-04-24 | End: 2018-04-25

## 2018-04-24 RX ORDER — OXYCODONE HCL 10 MG/1
10 TABLET, FILM COATED, EXTENDED RELEASE ORAL
Status: COMPLETED | OUTPATIENT
Start: 2018-04-24 | End: 2018-04-24

## 2018-04-24 RX ORDER — ONDANSETRON 2 MG/ML
4 INJECTION INTRAMUSCULAR; INTRAVENOUS EVERY 4 HOURS PRN
Status: DISCONTINUED | OUTPATIENT
Start: 2018-04-24 | End: 2018-04-25

## 2018-04-24 NOTE — OPERATIVE REPORT
[facility]    PATIENT'S NAME: Destinee Lopez   ATTENDING PHYSICIAN: Reagan Cardenas M.D. OPERATING PHYSICIAN: Reagan Cardenas M.D.    PATIENT ACCOUNT#:   [de-identified]    LOCATION:  PACU [facility] PACU 6 EDWP 10  MEDICAL RECORD #:   [id number]       DATE OF without apparent difficulty. The right lower extremity was prepped and draped in the usual sterile fashion.   After surgical time-out and infusion of prophylactic antibiotics, an Esmarch was applied to the right lower extremity, and the tourniquet was infl local anesthetic. The actual Arturo Persona right 12 mm medial congruent articular surface was placed and locked in position in the usual fashion.   The knee was once more taken through a full range of motion felt to have excellent stability, range of justin

## 2018-04-24 NOTE — CONSULTS
[facility]  [address] Patient Status:  Inpatient    1950 MRN [id number]   [facility] 3SW-A Attending Carlos Curtis MD   Hosp Day # 0 PCP Alfred Hastings MD     Reason for consult: Medical management    Request Swelling    Medications:    No current facility-administered medications on file prior to encounter. Current Outpatient Prescriptions on File Prior to Encounter:  Lisinopril-Hydrochlorothiazide 20-12.5 MG Oral Tab Take 1 tablet by mouth daily.  Disp: 90 t replacement doing well postop day #0  2. History of CAD stable will resume home medicines  3. Hypertension monitor closely to resume blood pressure medicines  4.  Hyperlipidemia      Quality:  · DVT Prophylaxis: scd  · CODE status: full  · Smalls: no    Plan

## 2018-04-24 NOTE — PLAN OF CARE
Arrived via bed.  vss. Denies pain. States ble numb, tingling, however, has sensation to touch to ble. Able to wiggle toes and dorsi, plantar flex ble. Juan drain with scant amount of sanguineous fluid in canister. Ace wrap clean, dry, intact.   Ins

## 2018-04-24 NOTE — ANESTHESIA POSTPROCEDURE EVALUATION
[address] Patient Status:  Surgery Admit   Age/Gender 78year old female MRN [id number]   [facility] SURGERY Attending Lizbeth Norwood MD   Hosp Day # 0 PCP Renetta Packer MD       Anesthesia Post-op Note    Procedure(s)

## 2018-04-24 NOTE — INTERVAL H&P NOTE
Pre-op Diagnosis: RIGHT KNEE DEGENERATIVE JOINT DISEASE      The above referenced H&P was reviewed by Ti Garay MD on 4/24/2018, the patient was examined and no significant changes have occurred in the patient's condition since the H&P was performed.

## 2018-04-24 NOTE — BRIEF OP NOTE
Pre-Operative Diagnosis: RIGHT KNEE DEGENERATIVE JOINT DISEASE       Post-Operative Diagnosis: RIGHT KNEE DEGENERATIVE JOINT DISEASE        Procedure Performed:   Procedure(s):  RIGHT TOTAL KNEE ARTHROPLASTY      Surgeon(s) and Role:     * Chilango Angela

## 2018-04-24 NOTE — PROGRESS NOTES
Pharmacy Note: Renal dose adjustment for Metaclopramide (Reglan)  Maury Hickman has been prescribed Metoclopramide (Reglan) 10 mg every 6 hours as needed.     CrCl estimated at 36.9 ml/min (based on labs from 4/10/18, Scr =1.46)    Her calculated creatinin

## 2018-04-24 NOTE — PROGRESS NOTES
Pharmacy Note: Renal dose adjustment Ketorolac (Toradol)  Katiaema Parkinson has been prescribed Ketorolac (Toradol) 30 mg every 6 hours.      CrCl estimated at 36.9 ml/min (based on labs from 4/10/18, Scr =1.46)    Her calculated creatinine clearance is less t

## 2018-04-24 NOTE — ANESTHESIA PREPROCEDURE EVALUATION
PRE-OP EVALUATION    Patient Name: Luna Fontana    Pre-op Diagnosis: RIGHT KNEE DEGENERATIVE JOINT DISEASE      Procedure(s):  RIGHT TOTAL KNEE ARTHROPLASTY      Surgeon(s) and Role:     Cathleen Purvis MD - Primary    Pre-op vitals reviewed.   Temp: 97 TONSILLECTOMY      Comment: 2 nd grade  02/2018: TOTAL KNEE REPLACEMENT Left     Smoking status: Former Smoker     Quit date: 2/18/2001    Smokeless tobacco: Never Used    Alcohol use No       Drug use: No     Available pre-op labs reviewed.     Lab Results

## 2018-04-25 ENCOUNTER — TELEPHONE (OUTPATIENT)
Dept: FAMILY MEDICINE CLINIC | Facility: CLINIC | Age: 68
End: 2018-04-25

## 2018-04-25 VITALS
BODY MASS INDEX: 37.75 KG/M2 | RESPIRATION RATE: 15 BRPM | HEART RATE: 74 BPM | DIASTOLIC BLOOD PRESSURE: 52 MMHG | SYSTOLIC BLOOD PRESSURE: 133 MMHG | HEIGHT: 67 IN | OXYGEN SATURATION: 95 % | TEMPERATURE: 97 F | WEIGHT: 240.5 LBS

## 2018-04-25 PROCEDURE — 99232 SBSQ HOSP IP/OBS MODERATE 35: CPT | Performed by: HOSPITALIST

## 2018-04-25 RX ORDER — HYDROCODONE BITARTRATE AND ACETAMINOPHEN 7.5; 325 MG/1; MG/1
1-2 TABLET ORAL EVERY 4 HOURS PRN
Qty: 75 TABLET | Refills: 0 | Status: SHIPPED | OUTPATIENT
Start: 2018-04-25 | End: 2018-07-16

## 2018-04-25 RX ORDER — ONDANSETRON 4 MG/1
4 TABLET, ORALLY DISINTEGRATING ORAL EVERY 8 HOURS PRN
Qty: 20 TABLET | Refills: 1 | Status: SHIPPED | OUTPATIENT
Start: 2018-04-25 | End: 2018-07-16

## 2018-04-25 RX ORDER — HYDROCODONE BITARTRATE AND ACETAMINOPHEN 5; 325 MG/1; MG/1
2 TABLET ORAL EVERY 4 HOURS PRN
Status: DISCONTINUED | OUTPATIENT
Start: 2018-04-25 | End: 2018-04-25

## 2018-04-25 RX ORDER — MELATONIN
325
Qty: 90 TABLET | Refills: 0 | Status: SHIPPED | OUTPATIENT
Start: 2018-04-25 | End: 2018-07-16

## 2018-04-25 RX ORDER — HYDROCODONE BITARTRATE AND ACETAMINOPHEN 5; 325 MG/1; MG/1
1 TABLET ORAL EVERY 4 HOURS PRN
Status: DISCONTINUED | OUTPATIENT
Start: 2018-04-25 | End: 2018-04-25

## 2018-04-25 RX ORDER — AMOXICILLIN 250 MG
2 CAPSULE ORAL 2 TIMES DAILY
Qty: 120 TABLET | Refills: 0 | Status: SHIPPED | OUTPATIENT
Start: 2018-04-25 | End: 2018-07-16

## 2018-04-25 RX ORDER — SCOLOPAMINE TRANSDERMAL SYSTEM 1 MG/1
1 PATCH, EXTENDED RELEASE TRANSDERMAL ONCE
Status: DISCONTINUED | OUTPATIENT
Start: 2018-04-25 | End: 2018-04-25

## 2018-04-25 RX ORDER — CELECOXIB 200 MG/1
200 CAPSULE ORAL DAILY
Qty: 30 CAPSULE | Refills: 1 | Status: SHIPPED | OUTPATIENT
Start: 2018-04-25 | End: 2018-06-24

## 2018-04-25 RX ORDER — HYDROCODONE BITARTRATE AND ACETAMINOPHEN 5; 325 MG/1; MG/1
1-2 TABLET ORAL EVERY 4 HOURS PRN
Qty: 50 TABLET | Refills: 0 | Status: SHIPPED | OUTPATIENT
Start: 2018-04-25 | End: 2019-01-01 | Stop reason: ALTCHOICE

## 2018-04-25 NOTE — PROGRESS NOTES
(daughter) attended group discharge education class. Patient was nauseated and remained in her room. Discharge education provided utilizing \"hip/knee replacement discharge instructions\" sheet. Teach back done. Questions solicited and answered.

## 2018-04-25 NOTE — PROGRESS NOTES
[facility]  Progress Note     Karl Posada Patient Status:  Inpatient    1950 MRN [id number]   [facility] 3SW-A Attending Darcy Wang MD   Hosp Day # 1 PCP Helen Campos MD     Chief Complaint: s/p tka    S: Patient feels knee replacement 2 months ago and now status post right total knee replacement doing well postop day #1  2. History of CAD stable will resume home medicines  3. Hypertension monitor closely to resume blood pressure medicines  4.  Hyperlipidemia        Quali

## 2018-04-25 NOTE — CM/SW NOTE
Pt is a 79 yo female admitted for right knee degenerative joint disease. Pt had knee surgery on 4/24. Pt lives alone in a mobile home with 5 stairs to get in.  Pt's two dtrs Marcie Brooks and Robin Stapleton will be staying with pt when she is d/c'd.   Pt also has a son l

## 2018-04-25 NOTE — PROGRESS NOTES
[address] Patient Status:  Inpatient    1950 MRN [id number]   [facility] 3SW-A Attending Swetha Alonso MD   Hosp Day # 1 PCP Carlyn Beach MD     Subjective:  Post-Operative Day: 1 Status Post right Total Knee

## 2018-04-25 NOTE — DISCHARGE SUMMARY
[facility]  Discharge Summary    Sugar Keating Patient Status:  Inpatient    1950 MRN [id number]   [facility] 3SW-A Attending Gerhardt Norris, MD   [address] Day # 1 PCP Minus MD Shama     Date of Admission: 2018 Disposition: Elina 0    HYDROcodone-acetaminophen 5-325 MG Oral Tab  Take 1-2 tablets by mouth every 4 (four) hours as needed for Pain. Qty: 50 tablet Refills: 0    Sennosides-Docusate Sodium (SENNA S) 8.6-50 MG Oral Tab  Take 2 tablets by mouth 2 (two) times daily.  As need

## 2018-04-25 NOTE — PHYSICAL THERAPY NOTE
PHYSICAL THERAPY KNEE TREATMENT NOTE - INPATIENT     Room Number: 385/385-A     Session: 1 & 2   Number of Visits to Meet Established Goals: 3    Presenting Problem: S/p Right TKA on 4/24/18    Problem List  Active Problems:    Right knee DJD      Past Me etc.): None   -   Moving from lying on back to sitting on the side of the bed?: None   How much help from another person does the patient currently need. ..   -   Moving to and from a bed to a chair (including a wheelchair)?: None   -   Need to walk in hosp heel/toe raises 10 reps 15 reps   Standing knee flexion 10 reps 15 reps   Extension stretch  1x 1x     Comments: Pt participated in group session, tolerance was good.    was present yes   is a daughter    Knee ROM   R Knee Flexion (degrees): 84 on 4/24/2018, updated 4/25/18

## 2018-04-25 NOTE — OCCUPATIONAL THERAPY NOTE
OCCUPATIONAL THERAPY QUICK EVALUATION - INPATIENT    Room Number: 385/385-A  Evaluation Date: 4/25/2018     Type of Evaluation: Quick Eval  Presenting Problem:  (R TKA)    Physician Order: IP Consult to Occupational Therapy  Reason for Therapy:  ADL/IADL D today    OBJECTIVE  Precautions: Drain(s)  Fall Risk: High fall risk    WEIGHT BEARING RESTRICTION  Weight Bearing Restriction: R lower extremity        R Lower Extremity: Weight Bearing as Tolerated       PAIN ASSESSMENT  Ratin          COGNITION  wfl outcome measures completed include AMPA. In this OT evaluation patient presents with the following performance deficits: decreased balance.  These deficits impact the patient’s ability to participate in ADLs, instrumental activities of daily living, rest a

## 2018-04-25 NOTE — CM/SW NOTE
04/25/18 1600   Discharge disposition   Expected discharge disposition Home-Health   Name of Facillity/Home Care/Hospice [facility]   Home services after discharge Skilled home care   Discharge transportation Private car

## 2018-05-02 ENCOUNTER — OFFICE VISIT (OUTPATIENT)
Dept: FAMILY MEDICINE CLINIC | Facility: CLINIC | Age: 68
End: 2018-05-02

## 2018-05-02 ENCOUNTER — APPOINTMENT (OUTPATIENT)
Dept: LAB | Age: 68
End: 2018-05-02
Attending: FAMILY MEDICINE
Payer: MEDICARE

## 2018-05-02 VITALS
WEIGHT: 233 LBS | OXYGEN SATURATION: 94 % | BODY MASS INDEX: 36 KG/M2 | DIASTOLIC BLOOD PRESSURE: 64 MMHG | TEMPERATURE: 100 F | SYSTOLIC BLOOD PRESSURE: 112 MMHG | HEART RATE: 88 BPM

## 2018-05-02 DIAGNOSIS — Z22.322 MRSA NASAL COLONIZATION: ICD-10-CM

## 2018-05-02 DIAGNOSIS — M17.11 PRIMARY OSTEOARTHRITIS OF RIGHT KNEE: Primary | ICD-10-CM

## 2018-05-02 DIAGNOSIS — Z96.651 S/P TOTAL KNEE ARTHROPLASTY, RIGHT: ICD-10-CM

## 2018-05-02 PROCEDURE — 87081 CULTURE SCREEN ONLY: CPT

## 2018-05-02 PROCEDURE — 99214 OFFICE O/P EST MOD 30 MIN: CPT | Performed by: FAMILY MEDICINE

## 2018-05-02 NOTE — PROGRESS NOTES
Fiorella Ramirez is a 78year old female. Patient presents with: Follow - Up: room 6    Subjective   HPI:   She had surgery this last week. She was discharged home in good condition. Patient had the right knee replaced.   There was no intraoperative a medications on file prior to visit.       Past Medical History:   Diagnosis Date   • Arthritis, degenerative 5/11/2015   • Coronary atherosclerosis    • Encounter for long-term (current) use of other medications 5/11/2015   • Essential hypertension 9/21/201 encounter diagnosis)  Mrsa nasal colonization  S/p total knee arthroplasty, right    Problem List Items Addressed This Visit        Musculoskeletal    RESOLVED: Arthritis, degenerative - Primary      Other Visit Diagnoses     MRSA nasal colonization

## 2018-05-11 ENCOUNTER — MED REC SCAN ONLY (OUTPATIENT)
Dept: FAMILY MEDICINE CLINIC | Facility: CLINIC | Age: 68
End: 2018-05-11

## 2018-06-05 ENCOUNTER — TELEPHONE (OUTPATIENT)
Dept: FAMILY MEDICINE CLINIC | Facility: CLINIC | Age: 68
End: 2018-06-05

## 2018-06-05 DIAGNOSIS — I10 ESSENTIAL HYPERTENSION: ICD-10-CM

## 2018-06-05 RX ORDER — LISINOPRIL AND HYDROCHLOROTHIAZIDE 20; 12.5 MG/1; MG/1
1 TABLET ORAL DAILY
Qty: 30 TABLET | Refills: 0 | Status: SHIPPED | OUTPATIENT
Start: 2018-06-05 | End: 2018-07-03

## 2018-07-03 ENCOUNTER — TELEPHONE (OUTPATIENT)
Dept: FAMILY MEDICINE CLINIC | Facility: CLINIC | Age: 68
End: 2018-07-03

## 2018-07-03 DIAGNOSIS — I10 ESSENTIAL HYPERTENSION: ICD-10-CM

## 2018-07-03 RX ORDER — PRAVASTATIN SODIUM 20 MG
TABLET ORAL
Qty: 90 TABLET | Refills: 0 | Status: SHIPPED | OUTPATIENT
Start: 2018-07-03 | End: 2018-10-09

## 2018-07-03 RX ORDER — LISINOPRIL AND HYDROCHLOROTHIAZIDE 20; 12.5 MG/1; MG/1
1 TABLET ORAL DAILY
Qty: 30 TABLET | Refills: 0 | Status: SHIPPED | OUTPATIENT
Start: 2018-07-03 | End: 2018-07-16

## 2018-07-03 RX ORDER — MELOXICAM 15 MG/1
15 TABLET ORAL DAILY
Qty: 90 TABLET | Refills: 0 | Status: SHIPPED | OUTPATIENT
Start: 2018-07-03 | End: 2018-01-01

## 2018-07-03 NOTE — TELEPHONE ENCOUNTER
Last OV: 5/2/2018  Last labs: 4/10/2018  Lisinopril-hctz: 6/5/2018 #30 no RF  Pravastatin: 3/5/2018 #90 no RF  Meloxicam: 1/10/2018 #90 w/ 1RF

## 2018-07-03 NOTE — TELEPHONE ENCOUNTER
Lisinopril-Hydrochlorothiazide 20-12.5 MG Oral Tab she only wants 1 month called in.      Pravastatin Sodium 20 MG Oral Tab  Qty 90 call in     meloxicam   Qty 90    Call to [facility] in [facility]

## 2018-07-16 ENCOUNTER — OFFICE VISIT (OUTPATIENT)
Dept: FAMILY MEDICINE CLINIC | Facility: CLINIC | Age: 68
End: 2018-07-16

## 2018-07-16 VITALS
DIASTOLIC BLOOD PRESSURE: 72 MMHG | WEIGHT: 232.13 LBS | TEMPERATURE: 98 F | BODY MASS INDEX: 36.01 KG/M2 | SYSTOLIC BLOOD PRESSURE: 130 MMHG | HEIGHT: 67.5 IN | OXYGEN SATURATION: 95 % | HEART RATE: 72 BPM

## 2018-07-16 DIAGNOSIS — I10 ESSENTIAL HYPERTENSION: ICD-10-CM

## 2018-07-16 DIAGNOSIS — Z00.00 ENCOUNTER FOR ANNUAL HEALTH EXAMINATION: Primary | ICD-10-CM

## 2018-07-16 DIAGNOSIS — Z12.11 SCREENING FOR COLON CANCER: ICD-10-CM

## 2018-07-16 DIAGNOSIS — Z11.59 NEED FOR HEPATITIS C SCREENING TEST: ICD-10-CM

## 2018-07-16 DIAGNOSIS — Z12.31 VISIT FOR SCREENING MAMMOGRAM: ICD-10-CM

## 2018-07-16 PROCEDURE — G0439 PPPS, SUBSEQ VISIT: HCPCS | Performed by: FAMILY MEDICINE

## 2018-07-16 RX ORDER — LISINOPRIL AND HYDROCHLOROTHIAZIDE 20; 12.5 MG/1; MG/1
1 TABLET ORAL DAILY
Qty: 30 TABLET | Refills: 3 | Status: SHIPPED | OUTPATIENT
Start: 2018-07-16 | End: 2018-10-09

## 2018-07-16 NOTE — PATIENT INSTRUCTIONS
Soraya Massey's SCREENING SCHEDULE   Tests on this list are recommended by your physician but may not be covered, or covered at this frequency, by your insurer. Please check with your insurance carrier before scheduling to verify coverage.    PREVENTATIV Cancer Screening  Covered up to Age 76     Colonoscopy Screen   Covered every 10 years- more often if abnormal Colonoscopy,10 Years due on 06/08/2001 Update Health Maintenance if applicable    Flex Sigmoidoscopy Screen  Covered every 5 years No results fou visit on 02/18/15  -INFLUENZA VIRUS VACCINE, >=1YEARS OF AGE    Please get every year    Pneumococcal 13 (Prevnar)  Covered Once after 65   Orders placed or performed in visit on 06/28/16  -PNEUMOCOCCAL VACC, 13 HAYDER IM    Please get once after your 65th b

## 2018-07-16 NOTE — PROGRESS NOTES
HPI:   Sugar Keating is a 80year old female who presents for a Medicare Subsequent Annual Wellness visit (Pt already had Initial Annual Wellness).     Feels well  Some wt loss   Doing great after knee surgery    Her last annual assessment has been over Practice)  Syed Carrillo MD (SURGERY, ORTHOPEDIC)  Gabe Burn (Cardiovascular Diseases)    Patient Active Problem List:     Hyperlipidemia     Obesity (BMI 30-39. 9)     Essential hypertension     S/P PTCA (percutaneous transluminal coronary angioplas (postoperative nausea and vomiting); and Visual impairment.     She  has a past surgical history that includes cath percutaneous  transluminal coronary angioplasty (2000); arthroscopy of joint unlisted (Left, 2001); tonsillectomy; total knee replacement (Carrie Pine ([id number]=[id number]); Future    Screening for colon cancer  -     OCCULT BLOOD, FECAL, IMMUNOASSAY; Future    Need for hepatitis C screening test  -     HCV ANTIBODY;  Future         Diet assessment: good     PLAN:  The patient indicates understanding of these issue ----------       Cardiovascular Disease Screening     LDL Annually LDL Cholesterol (mg/dL)   Date Value   04/10/2018      Comment:     Unable to calculate LDL due to Triglycerides >400.0 mg/dL            EKG - w/ Initial Preventative Physical Exam only, Medium/high risk factors:   End-stage renal disease   Hemophiliacs who received Factor VIII or IX concentrates   Clients of institutions for the mentally retarded   Persons who live in the same house as a HepB virus carrier   Homosexual men   Illicit injec

## 2018-07-20 ENCOUNTER — MED REC SCAN ONLY (OUTPATIENT)
Dept: FAMILY MEDICINE CLINIC | Facility: CLINIC | Age: 68
End: 2018-07-20

## 2018-09-07 ENCOUNTER — OFFICE VISIT (OUTPATIENT)
Dept: FAMILY MEDICINE CLINIC | Facility: CLINIC | Age: 68
End: 2018-09-07
Payer: MEDICARE

## 2018-09-07 VITALS
SYSTOLIC BLOOD PRESSURE: 130 MMHG | TEMPERATURE: 97 F | DIASTOLIC BLOOD PRESSURE: 80 MMHG | HEIGHT: 66.75 IN | BODY MASS INDEX: 36.28 KG/M2 | WEIGHT: 231.13 LBS | RESPIRATION RATE: 12 BRPM | HEART RATE: 60 BPM

## 2018-09-07 DIAGNOSIS — Z23 NEED FOR SHINGLES VACCINE: ICD-10-CM

## 2018-09-07 DIAGNOSIS — G89.29 CHRONIC RIGHT-SIDED LOW BACK PAIN WITHOUT SCIATICA: Primary | ICD-10-CM

## 2018-09-07 DIAGNOSIS — M54.50 CHRONIC RIGHT-SIDED LOW BACK PAIN WITHOUT SCIATICA: Primary | ICD-10-CM

## 2018-09-07 PROCEDURE — 99213 OFFICE O/P EST LOW 20 MIN: CPT | Performed by: FAMILY MEDICINE

## 2018-09-09 NOTE — PROGRESS NOTES
Arlette Sosa is a 80year old female. Patient presents with:  Back Pain: off and on for years , inrm 1    Subjective   HPI:   Here for follow for her back pain  She has had for years  And she she was dealing with chronic knee pain for so long  . ...   Sta Encounters:  09/07/18 : 231 lb 2 oz  07/16/18 : 232 lb 2 oz  05/02/18 : 233 lb  04/24/18 : 240 lb 8.4 oz  04/11/18 : 239 lb 6 oz  02/13/18 : 248 lb 10.9 oz      REVIEW OF SYSTEMS:   GENERAL HEALTH: feels well no complaints    NEURO: denies headaches  See H

## 2018-09-14 ENCOUNTER — TELEPHONE (OUTPATIENT)
Dept: FAMILY MEDICINE CLINIC | Facility: CLINIC | Age: 68
End: 2018-09-14

## 2018-09-14 NOTE — TELEPHONE ENCOUNTER
Patient is in the lobby and would like the nurse to look at her arm. She received a shingles shot on Monday and the injection site is red and warm.

## 2018-09-14 NOTE — TELEPHONE ENCOUNTER
Patient advised, asks if she should go ahead with the second dose. Advised yes she should.    THIAGOOTori Jones Settler

## 2018-09-14 NOTE — TELEPHONE ENCOUNTER
Had a shingles shot at [address] on Monday. Injection site is red, swollen and warm to touch. Approximately 3 inch area at left deltoid site. Uncomfortable if she rolls over on it or bumps it. Her family is quite concerned about it, it has bee 5 days.  Otlenin

## 2018-09-17 ENCOUNTER — OFFICE VISIT (OUTPATIENT)
Dept: PHYSICAL THERAPY | Age: 68
End: 2018-09-17
Attending: FAMILY MEDICINE
Payer: MEDICARE

## 2018-09-17 DIAGNOSIS — G89.29 CHRONIC RIGHT-SIDED LOW BACK PAIN WITHOUT SCIATICA: ICD-10-CM

## 2018-09-17 DIAGNOSIS — M54.50 CHRONIC RIGHT-SIDED LOW BACK PAIN WITHOUT SCIATICA: ICD-10-CM

## 2018-09-17 PROCEDURE — 97110 THERAPEUTIC EXERCISES: CPT

## 2018-09-17 PROCEDURE — 97162 PT EVAL MOD COMPLEX 30 MIN: CPT

## 2018-09-17 NOTE — PROGRESS NOTES
SPINE EVALUATION:   Referring Physician: Dr. Beach  Diagnosis: LBP     Date of Service: 9/17/2018     PATIENT Verónica Peter is a 80year old y/o female who presents to therapy today with complaints of pain has come and gone for the last 10y ER: R 4/5; L 4/5  Hip IR: R 5/5; L 5/5  Knee Flexion: R 5/5; L 5/5   Knee extension: R 5/5; L 5/5   PF: R 5/5; L 5/5  DF: R 5/5; L 5/5     Flexibility:   LE   Hip Flexor: RModerate tightness, L Moderate tightness  Hamstrings: R Mild tightness; L mild tight and Moving Around CK: 40-59% impaired, limited, or restricted  Projected G Code:  Mobility: Walking and Moving Around CJ: 20-39% impaired, limited, or restricted      Patient/Family/Caregiver was advised of these findings, precautions, and treatment options

## 2018-09-18 ENCOUNTER — APPOINTMENT (OUTPATIENT)
Dept: LAB | Facility: HOSPITAL | Age: 68
End: 2018-09-18
Attending: FAMILY MEDICINE
Payer: MEDICARE

## 2018-09-18 DIAGNOSIS — Z12.11 SCREENING FOR COLON CANCER: ICD-10-CM

## 2018-09-18 PROCEDURE — 82274 ASSAY TEST FOR BLOOD FECAL: CPT

## 2018-09-21 ENCOUNTER — OFFICE VISIT (OUTPATIENT)
Dept: PHYSICAL THERAPY | Age: 68
End: 2018-09-21
Attending: FAMILY MEDICINE
Payer: MEDICARE

## 2018-09-21 DIAGNOSIS — G89.29 CHRONIC RIGHT-SIDED LOW BACK PAIN WITHOUT SCIATICA: ICD-10-CM

## 2018-09-21 DIAGNOSIS — M54.50 CHRONIC RIGHT-SIDED LOW BACK PAIN WITHOUT SCIATICA: ICD-10-CM

## 2018-09-21 PROCEDURE — 97110 THERAPEUTIC EXERCISES: CPT

## 2018-09-21 PROCEDURE — 97140 MANUAL THERAPY 1/> REGIONS: CPT

## 2018-09-21 NOTE — PROGRESS NOTES
Dx: LBP         Authorized # of Visits:  10         Next MD visit: none scheduled  Fall Risk: standard         Precautions: n/a             Subjective: Worked several hours yesterday and then had to make Wedding desserts in kitchen for several hours.  NGUYEN OLMSTEAD Skilled Services: STM and manual stretching to reduce pain and improve mobility. Tactile cues to engage TA.     Charges: manual therapy 1, therapeutic ex 2       Total Timed Treatment: 45 min  Total Treatment Time:

## 2018-09-24 ENCOUNTER — OFFICE VISIT (OUTPATIENT)
Dept: PHYSICAL THERAPY | Age: 68
End: 2018-09-24
Attending: FAMILY MEDICINE
Payer: MEDICARE

## 2018-09-24 DIAGNOSIS — G89.29 CHRONIC RIGHT-SIDED LOW BACK PAIN WITHOUT SCIATICA: ICD-10-CM

## 2018-09-24 DIAGNOSIS — M54.50 CHRONIC RIGHT-SIDED LOW BACK PAIN WITHOUT SCIATICA: ICD-10-CM

## 2018-09-24 PROCEDURE — 97110 THERAPEUTIC EXERCISES: CPT

## 2018-09-24 PROCEDURE — 97140 MANUAL THERAPY 1/> REGIONS: CPT

## 2018-09-24 NOTE — PROGRESS NOTES
Dx: LBP         Authorized # of Visits:  10         Next MD visit: none scheduled  Fall Risk: standard         Precautions: n/a             Subjective: Oswaldo Davis yesterday in garage, landed on R side. Increased pain yesterday and today.     Objective:  Strength: TA bracing   instruction TA bracing 5\" 30x  TA bracing with hip add iso 5\"x30  TA bracing with hip abd/ER iso grn 5\"x20         Seated flexion stretch 15\"x10                                            Skilled Services: STM and manual stretching t

## 2018-09-28 ENCOUNTER — OFFICE VISIT (OUTPATIENT)
Dept: PHYSICAL THERAPY | Age: 68
End: 2018-09-28
Attending: FAMILY MEDICINE
Payer: MEDICARE

## 2018-09-28 DIAGNOSIS — G89.29 CHRONIC RIGHT-SIDED LOW BACK PAIN WITHOUT SCIATICA: ICD-10-CM

## 2018-09-28 DIAGNOSIS — M54.50 CHRONIC RIGHT-SIDED LOW BACK PAIN WITHOUT SCIATICA: ICD-10-CM

## 2018-09-28 PROCEDURE — 97140 MANUAL THERAPY 1/> REGIONS: CPT

## 2018-09-28 PROCEDURE — 97110 THERAPEUTIC EXERCISES: CPT

## 2018-09-28 NOTE — PROGRESS NOTES
Dx: LBP         Authorized # of Visits:  10         Next MD visit: none scheduled  Fall Risk: standard         Precautions: n/a             Subjective: Feel a tightness in R side of trunk, better than it was before PT.  Being on feet for extended period of glute 3x30\" Stretching-  B piriformis 3x30\"  B hamstring 3x30\"  B glute 3x30\" Stretching-  B piriformis 3x30\"  B hamstring 3x30\"  B glute 3x30\"       TA bracing   instruction TA bracing 5\" 30x  TA bracing with hip add iso 5\"x30  TA bracing with hi

## 2018-10-01 ENCOUNTER — OFFICE VISIT (OUTPATIENT)
Dept: PHYSICAL THERAPY | Age: 68
End: 2018-10-01
Attending: FAMILY MEDICINE
Payer: MEDICARE

## 2018-10-01 ENCOUNTER — TELEPHONE (OUTPATIENT)
Dept: FAMILY MEDICINE CLINIC | Facility: CLINIC | Age: 68
End: 2018-10-01

## 2018-10-01 DIAGNOSIS — M54.50 CHRONIC RIGHT-SIDED LOW BACK PAIN WITHOUT SCIATICA: ICD-10-CM

## 2018-10-01 DIAGNOSIS — G89.29 CHRONIC RIGHT-SIDED LOW BACK PAIN WITHOUT SCIATICA: ICD-10-CM

## 2018-10-01 DIAGNOSIS — Z11.59 ENCOUNTER FOR HEPATITIS C SCREENING TEST FOR LOW RISK PATIENT: Primary | ICD-10-CM

## 2018-10-01 PROCEDURE — 97140 MANUAL THERAPY 1/> REGIONS: CPT

## 2018-10-01 PROCEDURE — 97110 THERAPEUTIC EXERCISES: CPT

## 2018-10-01 NOTE — PROGRESS NOTES
Dx: LBP         Authorized # of Visits:  10         Next MD visit: none scheduled  Fall Risk: standard         Precautions: n/a             Subjective:LB didn't hurt all weekend.  Still feel tightness in R side of trunk, feel it especially with getting gran 3x30\"  B hamstring 3x30\"  B glute 3x30\" Stretching-  B piriformis 3x30\"  B hamstring 3x30\"  B glute 3x30\" Stretching-  B piriformis 3x30\"  B hamstring 3x30\"  B glute 3x30\" Stretching-  B piriformis 3x30\"  B hamstring 3x30\"  B glute 3x30\"      T

## 2018-10-01 NOTE — TELEPHONE ENCOUNTER
Last OV: 9/7/2018  Last labs: 4/10/2018  Lisinopril: 7/16/2018 #30 w/ 3RF  Meloxicam: 7/3/2018 #90 no RF  Pravastatin: 7/3/2018 #90 no RF    Ordered hep c  Screening per pt request    Advised pt she is due for fasting blood work  Future Appointments   Date

## 2018-10-02 DIAGNOSIS — Z12.11 SCREENING FOR COLON CANCER: Primary | ICD-10-CM

## 2018-10-05 ENCOUNTER — APPOINTMENT (OUTPATIENT)
Dept: LAB | Age: 68
End: 2018-10-05
Attending: FAMILY MEDICINE
Payer: MEDICARE

## 2018-10-05 ENCOUNTER — OFFICE VISIT (OUTPATIENT)
Dept: PHYSICAL THERAPY | Age: 68
End: 2018-10-05
Attending: FAMILY MEDICINE
Payer: MEDICARE

## 2018-10-05 DIAGNOSIS — G89.29 CHRONIC RIGHT-SIDED LOW BACK PAIN WITHOUT SCIATICA: ICD-10-CM

## 2018-10-05 DIAGNOSIS — Z11.59 ENCOUNTER FOR HEPATITIS C SCREENING TEST FOR LOW RISK PATIENT: ICD-10-CM

## 2018-10-05 DIAGNOSIS — M54.50 CHRONIC RIGHT-SIDED LOW BACK PAIN WITHOUT SCIATICA: ICD-10-CM

## 2018-10-05 DIAGNOSIS — Z11.59 NEED FOR HEPATITIS C SCREENING TEST: ICD-10-CM

## 2018-10-05 DIAGNOSIS — E78.2 MIXED HYPERLIPIDEMIA: ICD-10-CM

## 2018-10-05 PROCEDURE — 97110 THERAPEUTIC EXERCISES: CPT

## 2018-10-05 PROCEDURE — 97140 MANUAL THERAPY 1/> REGIONS: CPT

## 2018-10-05 PROCEDURE — 80061 LIPID PANEL: CPT

## 2018-10-05 PROCEDURE — 86803 HEPATITIS C AB TEST: CPT

## 2018-10-05 PROCEDURE — 36415 COLL VENOUS BLD VENIPUNCTURE: CPT

## 2018-10-05 NOTE — PROGRESS NOTES
Dx: LBP         Authorized # of Visits:  10         Next MD visit: none scheduled  Fall Risk: standard         Precautions: n/a             Subjective:LB continues to feel better, able to do more and more ADLs with less pain.      Objective:  Strength:   LE SL     Stretching-  B piriformis 3x30\"  B hamstring 3x30\"  B glute 3x30\" Stretching-  B piriformis 3x30\"  B hamstring 3x30\"  B glute 3x30\" Stretching-  B piriformis 3x30\"  B hamstring 3x30\"  B glute 3x30\" Stretching-  B piriformis 3x30\"  B hamstr

## 2018-10-08 ENCOUNTER — OFFICE VISIT (OUTPATIENT)
Dept: PHYSICAL THERAPY | Age: 68
End: 2018-10-08
Attending: FAMILY MEDICINE
Payer: MEDICARE

## 2018-10-08 DIAGNOSIS — M54.50 CHRONIC RIGHT-SIDED LOW BACK PAIN WITHOUT SCIATICA: ICD-10-CM

## 2018-10-08 DIAGNOSIS — E78.2 MIXED HYPERLIPIDEMIA: Primary | ICD-10-CM

## 2018-10-08 DIAGNOSIS — G89.29 CHRONIC RIGHT-SIDED LOW BACK PAIN WITHOUT SCIATICA: ICD-10-CM

## 2018-10-08 PROCEDURE — 97110 THERAPEUTIC EXERCISES: CPT

## 2018-10-08 PROCEDURE — 97140 MANUAL THERAPY 1/> REGIONS: CPT

## 2018-10-08 NOTE — PROGRESS NOTES
Dx: LBP         Authorized # of Visits:  10         Next MD visit: none scheduled  Fall Risk: standard         Precautions: n/a             Subjective: Back felt good over the weekend, walking a little longer than usual. No pain in back, but felt tightness STM-12'  R QL/piriformis in L SL STM-12'  R QL/piriformis in L SL STM-12'  R QL/piriformis in L SL STM-12'  R QL/piriformis in L SL    Stretching-  B piriformis 3x30\"  B hamstring 3x30\"  B glute 3x30\" Stretching-  B piriformis 3x30\"  B hamstring 3x30\"

## 2018-10-09 DIAGNOSIS — I10 ESSENTIAL HYPERTENSION: ICD-10-CM

## 2018-10-09 RX ORDER — PRAVASTATIN SODIUM 20 MG
TABLET ORAL
Qty: 90 TABLET | Refills: 1 | Status: SHIPPED | OUTPATIENT
Start: 2018-10-09 | End: 2019-01-01

## 2018-10-09 RX ORDER — LISINOPRIL AND HYDROCHLOROTHIAZIDE 20; 12.5 MG/1; MG/1
1 TABLET ORAL DAILY
Qty: 90 TABLET | Refills: 0 | Status: SHIPPED | OUTPATIENT
Start: 2018-10-09 | End: 2019-01-01

## 2018-10-12 ENCOUNTER — OFFICE VISIT (OUTPATIENT)
Dept: PHYSICAL THERAPY | Age: 68
End: 2018-10-12
Attending: FAMILY MEDICINE
Payer: MEDICARE

## 2018-10-12 DIAGNOSIS — M54.50 CHRONIC RIGHT-SIDED LOW BACK PAIN WITHOUT SCIATICA: ICD-10-CM

## 2018-10-12 DIAGNOSIS — G89.29 CHRONIC RIGHT-SIDED LOW BACK PAIN WITHOUT SCIATICA: ICD-10-CM

## 2018-10-12 PROCEDURE — 97140 MANUAL THERAPY 1/> REGIONS: CPT

## 2018-10-12 PROCEDURE — 97110 THERAPEUTIC EXERCISES: CPT

## 2018-10-12 NOTE — PROGRESS NOTES
Discharge Summary    Pt has attended 8, cancelled 0, and no shown 0 visits in Physical Therapy. Subjective: Back feels good, have resumed all normal ADLs without limitation. Assessment: Reduced tone and guarding in lumbar paraspinals and QL on R.  L advised of these findings, precautions, and treatment options and has agreed to actively participate in planning and for this course of care. Thank you for your referral. If you have any questions, please contact me at Dept: [phone].     Sincerely, bracing with hip abd/ER iso blue 5\"x20  TA bracing supine march 30x  Standing shoulder ext red 3x10 TA bracing 5\" 30x  TA bracing with hip add iso 5\"x30  TA bracing with hip abd/ER iso blue 5\"x20  TA bracing supine march 1#  30x  Standing shoulder ext

## 2018-10-15 ENCOUNTER — IMMUNIZATION (OUTPATIENT)
Dept: FAMILY MEDICINE CLINIC | Facility: CLINIC | Age: 68
End: 2018-10-15
Payer: MEDICARE

## 2018-10-15 PROCEDURE — G0008 ADMIN INFLUENZA VIRUS VAC: HCPCS | Performed by: FAMILY MEDICINE

## 2018-10-15 PROCEDURE — 90653 IIV ADJUVANT VACCINE IM: CPT | Performed by: FAMILY MEDICINE

## 2019-01-01 ENCOUNTER — OFFICE VISIT (OUTPATIENT)
Dept: HEMATOLOGY/ONCOLOGY | Facility: HOSPITAL | Age: 69
End: 2019-01-01
Attending: INTERNAL MEDICINE
Payer: MEDICARE

## 2019-01-01 ENCOUNTER — RESEARCH ENCOUNTER (OUTPATIENT)
Dept: HEMATOLOGY/ONCOLOGY | Facility: HOSPITAL | Age: 69
End: 2019-01-01

## 2019-01-01 ENCOUNTER — TELEPHONE (OUTPATIENT)
Dept: FAMILY MEDICINE CLINIC | Facility: CLINIC | Age: 69
End: 2019-01-01

## 2019-01-01 ENCOUNTER — HOSPITAL ENCOUNTER (OUTPATIENT)
Dept: INTERVENTIONAL RADIOLOGY/VASCULAR | Facility: HOSPITAL | Age: 69
Discharge: HOME | End: 2019-01-01
Attending: INTERNAL MEDICINE | Admitting: INTERNAL MEDICINE
Payer: MEDICARE

## 2019-01-01 ENCOUNTER — RT VISIT (OUTPATIENT)
Dept: RESPIRATORY THERAPY | Facility: HOSPITAL | Age: 69
End: 2019-01-01
Attending: INTERNAL MEDICINE
Payer: MEDICARE

## 2019-01-01 ENCOUNTER — HOSPITAL ENCOUNTER (OUTPATIENT)
Dept: NUCLEAR MEDICINE | Facility: HOSPITAL | Age: 69
Discharge: HOME | End: 2019-01-01
Attending: INTERNAL MEDICINE
Payer: MEDICARE

## 2019-01-01 ENCOUNTER — SNF/IP PROF CHARGE ONLY (OUTPATIENT)
Dept: HEMATOLOGY/ONCOLOGY | Facility: HOSPITAL | Age: 69
End: 2019-01-01

## 2019-01-01 ENCOUNTER — OFFICE VISIT (OUTPATIENT)
Dept: FAMILY MEDICINE CLINIC | Facility: CLINIC | Age: 69
End: 2019-01-01
Payer: MEDICARE

## 2019-01-01 ENCOUNTER — TELEPHONE (OUTPATIENT)
Dept: HEMATOLOGY/ONCOLOGY | Facility: HOSPITAL | Age: 69
End: 2019-01-01

## 2019-01-01 ENCOUNTER — HOSPITAL ENCOUNTER (OUTPATIENT)
Facility: HOSPITAL | Age: 69
Setting detail: HOSPITAL OUTPATIENT SURGERY
Discharge: HOME | End: 2019-01-01
Attending: INTERNAL MEDICINE | Admitting: INTERNAL MEDICINE
Payer: MEDICARE

## 2019-01-01 ENCOUNTER — HOSPITAL ENCOUNTER (OUTPATIENT)
Dept: GENERAL RADIOLOGY | Age: 69
Discharge: HOME | End: 2019-01-01
Attending: FAMILY MEDICINE
Payer: MEDICARE

## 2019-01-01 ENCOUNTER — APPOINTMENT (OUTPATIENT)
Dept: HEMATOLOGY/ONCOLOGY | Age: 69
End: 2019-01-01
Attending: INTERNAL MEDICINE
Payer: MEDICARE

## 2019-01-01 ENCOUNTER — HOSPITAL ENCOUNTER (OUTPATIENT)
Dept: GENERAL RADIOLOGY | Facility: HOSPITAL | Age: 69
Discharge: HOME | End: 2019-01-01
Attending: INTERNAL MEDICINE
Payer: MEDICARE

## 2019-01-01 ENCOUNTER — HOSPITAL ENCOUNTER (OUTPATIENT)
Dept: ULTRASOUND IMAGING | Facility: HOSPITAL | Age: 69
Discharge: HOME | End: 2019-01-01
Attending: INTERNAL MEDICINE
Payer: MEDICARE

## 2019-01-01 ENCOUNTER — LAB ENCOUNTER (OUTPATIENT)
Dept: LAB | Facility: HOSPITAL | Age: 69
End: 2019-01-01
Attending: INTERNAL MEDICINE
Payer: MEDICARE

## 2019-01-01 ENCOUNTER — APPOINTMENT (OUTPATIENT)
Dept: CV DIAGNOSTICS | Facility: HOSPITAL | Age: 69
DRG: 175 | End: 2019-01-01
Attending: EMERGENCY MEDICINE
Payer: MEDICARE

## 2019-01-01 ENCOUNTER — APPOINTMENT (OUTPATIENT)
Dept: GENERAL RADIOLOGY | Facility: HOSPITAL | Age: 69
DRG: 175 | End: 2019-01-01
Attending: EMERGENCY MEDICINE
Payer: MEDICARE

## 2019-01-01 ENCOUNTER — MED REC SCAN ONLY (OUTPATIENT)
Dept: FAMILY MEDICINE CLINIC | Facility: CLINIC | Age: 69
End: 2019-01-01

## 2019-01-01 ENCOUNTER — HOSPITAL ENCOUNTER (OUTPATIENT)
Dept: CT IMAGING | Facility: HOSPITAL | Age: 69
Discharge: HOME | End: 2019-01-01
Attending: NURSE PRACTITIONER
Payer: MEDICARE

## 2019-01-01 ENCOUNTER — SOCIAL WORK SERVICES (OUTPATIENT)
Dept: HEMATOLOGY/ONCOLOGY | Facility: HOSPITAL | Age: 69
End: 2019-01-01

## 2019-01-01 ENCOUNTER — MED REC SCAN ONLY (OUTPATIENT)
Dept: HEMATOLOGY/ONCOLOGY | Facility: HOSPITAL | Age: 69
End: 2019-01-01

## 2019-01-01 ENCOUNTER — APPOINTMENT (OUTPATIENT)
Dept: GENERAL RADIOLOGY | Facility: HOSPITAL | Age: 69
End: 2019-01-01
Attending: INTERNAL MEDICINE
Payer: MEDICARE

## 2019-01-01 ENCOUNTER — APPOINTMENT (OUTPATIENT)
Dept: CT IMAGING | Facility: HOSPITAL | Age: 69
DRG: 175 | End: 2019-01-01
Attending: EMERGENCY MEDICINE
Payer: MEDICARE

## 2019-01-01 ENCOUNTER — PATIENT OUTREACH (OUTPATIENT)
Dept: FAMILY MEDICINE CLINIC | Facility: CLINIC | Age: 69
End: 2019-01-01

## 2019-01-01 ENCOUNTER — HOSPITAL ENCOUNTER (OUTPATIENT)
Dept: CT IMAGING | Facility: HOSPITAL | Age: 69
Discharge: HOME | End: 2019-01-01
Attending: INTERNAL MEDICINE
Payer: MEDICARE

## 2019-01-01 ENCOUNTER — HOSPITAL ENCOUNTER (INPATIENT)
Facility: HOSPITAL | Age: 69
LOS: 1 days | DRG: 175 | End: 2019-01-01
Attending: EMERGENCY MEDICINE | Admitting: INTERNAL MEDICINE
Payer: MEDICARE

## 2019-01-01 ENCOUNTER — DIETICIAN VISIT (OUTPATIENT)
Dept: HEMATOLOGY/ONCOLOGY | Facility: HOSPITAL | Age: 69
End: 2019-01-01

## 2019-01-01 ENCOUNTER — DOCUMENTATION ONLY (OUTPATIENT)
Dept: HEMATOLOGY/ONCOLOGY | Facility: HOSPITAL | Age: 69
End: 2019-01-01

## 2019-01-01 ENCOUNTER — HOSPITAL ENCOUNTER (OUTPATIENT)
Dept: GENERAL RADIOLOGY | Facility: HOSPITAL | Age: 69
Discharge: HOME | End: 2019-01-01
Attending: NURSE PRACTITIONER
Payer: MEDICARE

## 2019-01-01 ENCOUNTER — ORDER TRANSCRIPTION (OUTPATIENT)
Dept: ADMINISTRATIVE | Facility: HOSPITAL | Age: 69
End: 2019-01-01

## 2019-01-01 ENCOUNTER — HOSPITAL ENCOUNTER (OUTPATIENT)
Facility: HOSPITAL | Age: 69
Setting detail: HOSPITAL OUTPATIENT SURGERY
Discharge: HOME | DRG: 175 | End: 2019-01-01
Attending: INTERNAL MEDICINE | Admitting: INTERNAL MEDICINE
Payer: MEDICARE

## 2019-01-01 ENCOUNTER — HOSPITAL ENCOUNTER (OUTPATIENT)
Dept: MRI IMAGING | Facility: HOSPITAL | Age: 69
Discharge: HOME | End: 2019-01-01
Attending: INTERNAL MEDICINE
Payer: MEDICARE

## 2019-01-01 ENCOUNTER — APPOINTMENT (OUTPATIENT)
Dept: ULTRASOUND IMAGING | Facility: HOSPITAL | Age: 69
End: 2019-01-01
Attending: INTERNAL MEDICINE
Payer: MEDICARE

## 2019-01-01 VITALS
HEART RATE: 105 BPM | WEIGHT: 228.63 LBS | HEIGHT: 66.73 IN | SYSTOLIC BLOOD PRESSURE: 112 MMHG | RESPIRATION RATE: 18 BRPM | DIASTOLIC BLOOD PRESSURE: 73 MMHG | TEMPERATURE: 98 F | OXYGEN SATURATION: 94 % | BODY MASS INDEX: 36.31 KG/M2

## 2019-01-01 VITALS
WEIGHT: 177.63 LBS | HEIGHT: 67 IN | HEART RATE: 91 BPM | DIASTOLIC BLOOD PRESSURE: 91 MMHG | OXYGEN SATURATION: 99 % | TEMPERATURE: 98 F | SYSTOLIC BLOOD PRESSURE: 167 MMHG | RESPIRATION RATE: 20 BRPM | BODY MASS INDEX: 27.88 KG/M2

## 2019-01-01 VITALS
TEMPERATURE: 99 F | WEIGHT: 190 LBS | BODY MASS INDEX: 29.82 KG/M2 | SYSTOLIC BLOOD PRESSURE: 140 MMHG | DIASTOLIC BLOOD PRESSURE: 74 MMHG | RESPIRATION RATE: 16 BRPM | HEIGHT: 67.01 IN | OXYGEN SATURATION: 97 % | HEART RATE: 87 BPM

## 2019-01-01 VITALS
RESPIRATION RATE: 18 BRPM | WEIGHT: 224.19 LBS | HEIGHT: 65.35 IN | HEART RATE: 100 BPM | BODY MASS INDEX: 36.9 KG/M2 | TEMPERATURE: 98 F | SYSTOLIC BLOOD PRESSURE: 108 MMHG | DIASTOLIC BLOOD PRESSURE: 60 MMHG | OXYGEN SATURATION: 93 %

## 2019-01-01 VITALS
RESPIRATION RATE: 24 BRPM | SYSTOLIC BLOOD PRESSURE: 95 MMHG | HEART RATE: 110 BPM | WEIGHT: 167 LBS | DIASTOLIC BLOOD PRESSURE: 66 MMHG | TEMPERATURE: 97 F | BODY MASS INDEX: 25.31 KG/M2 | HEIGHT: 68 IN | OXYGEN SATURATION: 100 %

## 2019-01-01 VITALS
TEMPERATURE: 97 F | RESPIRATION RATE: 19 BRPM | OXYGEN SATURATION: 92 % | SYSTOLIC BLOOD PRESSURE: 108 MMHG | HEART RATE: 92 BPM | DIASTOLIC BLOOD PRESSURE: 63 MMHG

## 2019-01-01 VITALS
HEART RATE: 116 BPM | DIASTOLIC BLOOD PRESSURE: 81 MMHG | HEIGHT: 67.01 IN | TEMPERATURE: 99 F | WEIGHT: 179.63 LBS | BODY MASS INDEX: 28.19 KG/M2 | OXYGEN SATURATION: 97 % | RESPIRATION RATE: 18 BRPM | SYSTOLIC BLOOD PRESSURE: 130 MMHG

## 2019-01-01 VITALS
HEART RATE: 90 BPM | DIASTOLIC BLOOD PRESSURE: 101 MMHG | OXYGEN SATURATION: 98 % | HEIGHT: 67.01 IN | TEMPERATURE: 98 F | SYSTOLIC BLOOD PRESSURE: 130 MMHG | SYSTOLIC BLOOD PRESSURE: 178 MMHG | RESPIRATION RATE: 148 BRPM | BODY MASS INDEX: 25.74 KG/M2 | WEIGHT: 195 LBS | BODY MASS INDEX: 30.61 KG/M2 | TEMPERATURE: 98 F | WEIGHT: 164 LBS | HEIGHT: 67 IN | RESPIRATION RATE: 18 BRPM | OXYGEN SATURATION: 96 % | DIASTOLIC BLOOD PRESSURE: 68 MMHG | HEART RATE: 96 BPM

## 2019-01-01 VITALS
OXYGEN SATURATION: 94 % | RESPIRATION RATE: 20 BRPM | TEMPERATURE: 98 F | SYSTOLIC BLOOD PRESSURE: 130 MMHG | WEIGHT: 198 LBS | HEIGHT: 67.01 IN | OXYGEN SATURATION: 96 % | TEMPERATURE: 97 F | DIASTOLIC BLOOD PRESSURE: 84 MMHG | SYSTOLIC BLOOD PRESSURE: 154 MMHG | WEIGHT: 189.81 LBS | RESPIRATION RATE: 18 BRPM | HEART RATE: 98 BPM | HEART RATE: 105 BPM | BODY MASS INDEX: 33 KG/M2 | DIASTOLIC BLOOD PRESSURE: 84 MMHG | BODY MASS INDEX: 29.79 KG/M2

## 2019-01-01 VITALS
HEART RATE: 80 BPM | RESPIRATION RATE: 16 BRPM | SYSTOLIC BLOOD PRESSURE: 133 MMHG | TEMPERATURE: 98 F | DIASTOLIC BLOOD PRESSURE: 83 MMHG

## 2019-01-01 VITALS
SYSTOLIC BLOOD PRESSURE: 160 MMHG | OXYGEN SATURATION: 98 % | WEIGHT: 171.19 LBS | HEIGHT: 67.01 IN | BODY MASS INDEX: 26.87 KG/M2 | DIASTOLIC BLOOD PRESSURE: 90 MMHG | RESPIRATION RATE: 18 BRPM | HEART RATE: 108 BPM | TEMPERATURE: 98 F

## 2019-01-01 VITALS
OXYGEN SATURATION: 96 % | HEIGHT: 67.01 IN | WEIGHT: 175.81 LBS | BODY MASS INDEX: 27.6 KG/M2 | SYSTOLIC BLOOD PRESSURE: 176 MMHG | TEMPERATURE: 98 F | DIASTOLIC BLOOD PRESSURE: 92 MMHG | RESPIRATION RATE: 18 BRPM | HEART RATE: 102 BPM

## 2019-01-01 VITALS
OXYGEN SATURATION: 100 % | TEMPERATURE: 98 F | SYSTOLIC BLOOD PRESSURE: 152 MMHG | DIASTOLIC BLOOD PRESSURE: 76 MMHG | WEIGHT: 167 LBS | RESPIRATION RATE: 21 BRPM | BODY MASS INDEX: 26.84 KG/M2 | HEART RATE: 69 BPM | HEIGHT: 66 IN

## 2019-01-01 VITALS
BODY MASS INDEX: 32 KG/M2 | SYSTOLIC BLOOD PRESSURE: 174 MMHG | DIASTOLIC BLOOD PRESSURE: 95 MMHG | OXYGEN SATURATION: 98 % | HEART RATE: 97 BPM | WEIGHT: 195.19 LBS | RESPIRATION RATE: 18 BRPM | TEMPERATURE: 97 F

## 2019-01-01 VITALS
HEART RATE: 92 BPM | OXYGEN SATURATION: 93 % | TEMPERATURE: 99 F | RESPIRATION RATE: 18 BRPM | DIASTOLIC BLOOD PRESSURE: 80 MMHG | HEIGHT: 65.35 IN | WEIGHT: 204 LBS | SYSTOLIC BLOOD PRESSURE: 149 MMHG | BODY MASS INDEX: 33.58 KG/M2

## 2019-01-01 VITALS
SYSTOLIC BLOOD PRESSURE: 113 MMHG | TEMPERATURE: 98 F | OXYGEN SATURATION: 92 % | WEIGHT: 222.5 LBS | BODY MASS INDEX: 35.33 KG/M2 | DIASTOLIC BLOOD PRESSURE: 69 MMHG | HEIGHT: 66.73 IN | HEART RATE: 103 BPM | RESPIRATION RATE: 20 BRPM

## 2019-01-01 VITALS
HEIGHT: 67.01 IN | HEART RATE: 96 BPM | TEMPERATURE: 99 F | SYSTOLIC BLOOD PRESSURE: 126 MMHG | RESPIRATION RATE: 16 BRPM | BODY MASS INDEX: 29.13 KG/M2 | DIASTOLIC BLOOD PRESSURE: 70 MMHG | WEIGHT: 185.63 LBS | OXYGEN SATURATION: 97 %

## 2019-01-01 VITALS
TEMPERATURE: 98 F | HEIGHT: 65.35 IN | WEIGHT: 216 LBS | HEART RATE: 109 BPM | BODY MASS INDEX: 35.56 KG/M2 | SYSTOLIC BLOOD PRESSURE: 160 MMHG | OXYGEN SATURATION: 93 % | DIASTOLIC BLOOD PRESSURE: 77 MMHG | RESPIRATION RATE: 18 BRPM

## 2019-01-01 VITALS
WEIGHT: 215.81 LBS | SYSTOLIC BLOOD PRESSURE: 136 MMHG | HEART RATE: 84 BPM | OXYGEN SATURATION: 90 % | TEMPERATURE: 98 F | RESPIRATION RATE: 16 BRPM | HEIGHT: 65.35 IN | DIASTOLIC BLOOD PRESSURE: 85 MMHG | BODY MASS INDEX: 35.52 KG/M2

## 2019-01-01 VITALS
HEART RATE: 98 BPM | SYSTOLIC BLOOD PRESSURE: 134 MMHG | OXYGEN SATURATION: 98 % | DIASTOLIC BLOOD PRESSURE: 76 MMHG | TEMPERATURE: 98 F | RESPIRATION RATE: 18 BRPM

## 2019-01-01 VITALS
TEMPERATURE: 98 F | DIASTOLIC BLOOD PRESSURE: 60 MMHG | HEART RATE: 73 BPM | SYSTOLIC BLOOD PRESSURE: 116 MMHG | OXYGEN SATURATION: 96 % | RESPIRATION RATE: 20 BRPM

## 2019-01-01 VITALS
OXYGEN SATURATION: 97 % | DIASTOLIC BLOOD PRESSURE: 91 MMHG | BODY MASS INDEX: 28 KG/M2 | HEART RATE: 88 BPM | RESPIRATION RATE: 20 BRPM | TEMPERATURE: 98 F | SYSTOLIC BLOOD PRESSURE: 146 MMHG | WEIGHT: 181.81 LBS

## 2019-01-01 VITALS
BODY MASS INDEX: 36.25 KG/M2 | WEIGHT: 220.19 LBS | RESPIRATION RATE: 18 BRPM | OXYGEN SATURATION: 99 % | HEIGHT: 65.35 IN | SYSTOLIC BLOOD PRESSURE: 120 MMHG | HEART RATE: 107 BPM | TEMPERATURE: 99 F | DIASTOLIC BLOOD PRESSURE: 81 MMHG

## 2019-01-01 VITALS
OXYGEN SATURATION: 97 % | RESPIRATION RATE: 18 BRPM | SYSTOLIC BLOOD PRESSURE: 147 MMHG | BODY MASS INDEX: 26 KG/M2 | TEMPERATURE: 97 F | DIASTOLIC BLOOD PRESSURE: 91 MMHG | HEART RATE: 63 BPM | WEIGHT: 167.81 LBS

## 2019-01-01 VITALS
WEIGHT: 228.13 LBS | TEMPERATURE: 97 F | DIASTOLIC BLOOD PRESSURE: 60 MMHG | BODY MASS INDEX: 36 KG/M2 | SYSTOLIC BLOOD PRESSURE: 110 MMHG

## 2019-01-01 VITALS
HEART RATE: 100 BPM | TEMPERATURE: 98 F | SYSTOLIC BLOOD PRESSURE: 156 MMHG | RESPIRATION RATE: 18 BRPM | DIASTOLIC BLOOD PRESSURE: 89 MMHG | WEIGHT: 183.81 LBS | BODY MASS INDEX: 29 KG/M2

## 2019-01-01 VITALS
TEMPERATURE: 100 F | RESPIRATION RATE: 20 BRPM | SYSTOLIC BLOOD PRESSURE: 144 MMHG | HEART RATE: 112 BPM | DIASTOLIC BLOOD PRESSURE: 90 MMHG | OXYGEN SATURATION: 98 % | BODY MASS INDEX: 29 KG/M2 | WEIGHT: 186.63 LBS

## 2019-01-01 VITALS
TEMPERATURE: 97 F | OXYGEN SATURATION: 93 % | RESPIRATION RATE: 18 BRPM | WEIGHT: 203.38 LBS | BODY MASS INDEX: 33 KG/M2 | SYSTOLIC BLOOD PRESSURE: 108 MMHG | DIASTOLIC BLOOD PRESSURE: 64 MMHG | HEART RATE: 76 BPM

## 2019-01-01 VITALS
TEMPERATURE: 99 F | WEIGHT: 200.81 LBS | SYSTOLIC BLOOD PRESSURE: 143 MMHG | HEART RATE: 112 BPM | OXYGEN SATURATION: 92 % | RESPIRATION RATE: 18 BRPM | BODY MASS INDEX: 33 KG/M2 | DIASTOLIC BLOOD PRESSURE: 73 MMHG

## 2019-01-01 VITALS
WEIGHT: 185.38 LBS | OXYGEN SATURATION: 98 % | TEMPERATURE: 99 F | HEIGHT: 67.01 IN | HEART RATE: 108 BPM | RESPIRATION RATE: 20 BRPM | SYSTOLIC BLOOD PRESSURE: 134 MMHG | DIASTOLIC BLOOD PRESSURE: 83 MMHG | BODY MASS INDEX: 29.1 KG/M2

## 2019-01-01 VITALS
OXYGEN SATURATION: 92 % | TEMPERATURE: 99 F | BODY MASS INDEX: 38 KG/M2 | HEART RATE: 90 BPM | WEIGHT: 239 LBS | DIASTOLIC BLOOD PRESSURE: 80 MMHG | SYSTOLIC BLOOD PRESSURE: 120 MMHG

## 2019-01-01 VITALS
SYSTOLIC BLOOD PRESSURE: 124 MMHG | OXYGEN SATURATION: 96 % | BODY MASS INDEX: 33 KG/M2 | WEIGHT: 200 LBS | HEART RATE: 99 BPM | TEMPERATURE: 99 F | DIASTOLIC BLOOD PRESSURE: 83 MMHG | RESPIRATION RATE: 16 BRPM

## 2019-01-01 VITALS — RESPIRATION RATE: 18 BRPM | DIASTOLIC BLOOD PRESSURE: 76 MMHG | SYSTOLIC BLOOD PRESSURE: 147 MMHG | HEART RATE: 86 BPM

## 2019-01-01 VITALS
TEMPERATURE: 98 F | OXYGEN SATURATION: 97 % | RESPIRATION RATE: 18 BRPM | HEART RATE: 65 BPM | DIASTOLIC BLOOD PRESSURE: 82 MMHG | SYSTOLIC BLOOD PRESSURE: 145 MMHG

## 2019-01-01 DIAGNOSIS — E87.6 HYPOKALEMIA: ICD-10-CM

## 2019-01-01 DIAGNOSIS — R63.0 POOR APPETITE: ICD-10-CM

## 2019-01-01 DIAGNOSIS — E06.4 DRUG-INDUCED THYROIDITIS: ICD-10-CM

## 2019-01-01 DIAGNOSIS — T45.1X5A CHEMOTHERAPY INDUCED NEUTROPENIA (HCC): ICD-10-CM

## 2019-01-01 DIAGNOSIS — N28.9 RENAL INSUFFICIENCY: ICD-10-CM

## 2019-01-01 DIAGNOSIS — T45.1X5A ANEMIA DUE TO CHEMOTHERAPY: ICD-10-CM

## 2019-01-01 DIAGNOSIS — D64.81 ANEMIA DUE TO CHEMOTHERAPY: ICD-10-CM

## 2019-01-01 DIAGNOSIS — D69.59 CHEMOTHERAPY-INDUCED THROMBOCYTOPENIA: ICD-10-CM

## 2019-01-01 DIAGNOSIS — E87.6 HYPOKALEMIA: Primary | ICD-10-CM

## 2019-01-01 DIAGNOSIS — R53.83 FATIGUE DUE TO TREATMENT: ICD-10-CM

## 2019-01-01 DIAGNOSIS — R43.2 DYSGEUSIA: ICD-10-CM

## 2019-01-01 DIAGNOSIS — C34.92 STAGE IV ADENOCARCINOMA OF LUNG, LEFT (HCC): ICD-10-CM

## 2019-01-01 DIAGNOSIS — R45.89 DEPRESSED MOOD: ICD-10-CM

## 2019-01-01 DIAGNOSIS — C79.31 BRAIN METASTASIS (HCC): ICD-10-CM

## 2019-01-01 DIAGNOSIS — B37.0 ORAL THRUSH: ICD-10-CM

## 2019-01-01 DIAGNOSIS — R11.0 NAUSEA: ICD-10-CM

## 2019-01-01 DIAGNOSIS — C34.92 STAGE IV ADENOCARCINOMA OF LUNG, LEFT (HCC): Primary | ICD-10-CM

## 2019-01-01 DIAGNOSIS — E83.42 HYPOMAGNESEMIA: ICD-10-CM

## 2019-01-01 DIAGNOSIS — D64.9 NORMOCYTIC ANEMIA: ICD-10-CM

## 2019-01-01 DIAGNOSIS — N18.30 STAGE 3 CHRONIC KIDNEY DISEASE (HCC): ICD-10-CM

## 2019-01-01 DIAGNOSIS — D70.1 CHEMOTHERAPY INDUCED NEUTROPENIA (HCC): ICD-10-CM

## 2019-01-01 DIAGNOSIS — D64.81 ANEMIA DUE TO CHEMOTHERAPY: Primary | ICD-10-CM

## 2019-01-01 DIAGNOSIS — C34.92 PRIMARY MALIGNANT NEOPLASM OF LEFT LUNG METASTATIC TO OTHER SITE (HCC): Primary | ICD-10-CM

## 2019-01-01 DIAGNOSIS — T45.1X5A CHEMOTHERAPY INDUCED NEUTROPENIA (HCC): Primary | ICD-10-CM

## 2019-01-01 DIAGNOSIS — C79.31 LUNG CANCER METASTATIC TO BRAIN (HCC): ICD-10-CM

## 2019-01-01 DIAGNOSIS — D64.81 ANEMIA ASSOCIATED WITH CHEMOTHERAPY: ICD-10-CM

## 2019-01-01 DIAGNOSIS — R05.9 COUGH: ICD-10-CM

## 2019-01-01 DIAGNOSIS — T45.1X5A CHEMOTHERAPY INDUCED NAUSEA AND VOMITING: ICD-10-CM

## 2019-01-01 DIAGNOSIS — C34.90 NON-SMALL CELL CARCINOMA OF LUNG (HCC): ICD-10-CM

## 2019-01-01 DIAGNOSIS — R53.81 PHYSICAL DECONDITIONING: ICD-10-CM

## 2019-01-01 DIAGNOSIS — T45.1X5A ANEMIA DUE TO CHEMOTHERAPY: Primary | ICD-10-CM

## 2019-01-01 DIAGNOSIS — D70.1 CHEMOTHERAPY INDUCED NEUTROPENIA (HCC): Primary | ICD-10-CM

## 2019-01-01 DIAGNOSIS — C34.90 ADENOCARCINOMA OF LUNG (HCC): Primary | ICD-10-CM

## 2019-01-01 DIAGNOSIS — C34.90 MALIGNANT NEOPLASM OF LUNG, UNSPECIFIED LATERALITY, UNSPECIFIED PART OF LUNG (HCC): ICD-10-CM

## 2019-01-01 DIAGNOSIS — N18.30 STAGE 3 CHRONIC KIDNEY DISEASE (HCC): Primary | ICD-10-CM

## 2019-01-01 DIAGNOSIS — R09.02 HYPOXIA: ICD-10-CM

## 2019-01-01 DIAGNOSIS — J44.9 COPD (CHRONIC OBSTRUCTIVE PULMONARY DISEASE) (HCC): ICD-10-CM

## 2019-01-01 DIAGNOSIS — D64.9 NORMOCYTIC ANEMIA: Primary | ICD-10-CM

## 2019-01-01 DIAGNOSIS — R11.2 CHEMOTHERAPY INDUCED NAUSEA AND VOMITING: ICD-10-CM

## 2019-01-01 DIAGNOSIS — E86.0 DEHYDRATION: ICD-10-CM

## 2019-01-01 DIAGNOSIS — R06.02 SOB (SHORTNESS OF BREATH) ON EXERTION: Primary | ICD-10-CM

## 2019-01-01 DIAGNOSIS — C34.92 ADENOCARCINOMA OF LEFT LUNG (HCC): Primary | ICD-10-CM

## 2019-01-01 DIAGNOSIS — I10 ESSENTIAL HYPERTENSION: ICD-10-CM

## 2019-01-01 DIAGNOSIS — K59.09 OTHER CONSTIPATION: ICD-10-CM

## 2019-01-01 DIAGNOSIS — I26.09 ACUTE PULMONARY EMBOLISM WITH ACUTE COR PULMONALE, UNSPECIFIED PULMONARY EMBOLISM TYPE (HCC): ICD-10-CM

## 2019-01-01 DIAGNOSIS — J91.0 MALIGNANT PLEURAL EFFUSION: ICD-10-CM

## 2019-01-01 DIAGNOSIS — R05.9 COUGH: Primary | ICD-10-CM

## 2019-01-01 DIAGNOSIS — R93.89 ABNORMAL CHEST X-RAY: ICD-10-CM

## 2019-01-01 DIAGNOSIS — R06.00 DYSPNEA ON MINIMAL EXERTION: ICD-10-CM

## 2019-01-01 DIAGNOSIS — C79.89 SECONDARY MALIGNANT NEOPLASM OF OTHER SPECIFIED SITES (HCC): ICD-10-CM

## 2019-01-01 DIAGNOSIS — J90 PLEURAL EFFUSION, BACTERIAL: ICD-10-CM

## 2019-01-01 DIAGNOSIS — R19.7 DIARRHEA, UNSPECIFIED TYPE: ICD-10-CM

## 2019-01-01 DIAGNOSIS — R93.89 ABNORMAL RADIOGRAPHIC EXAMINATION: ICD-10-CM

## 2019-01-01 DIAGNOSIS — C34.90 LUNG CANCER (HCC): ICD-10-CM

## 2019-01-01 DIAGNOSIS — R79.89 LOW TSH LEVEL: ICD-10-CM

## 2019-01-01 DIAGNOSIS — R91.8 MASS OF LEFT LUNG: Primary | ICD-10-CM

## 2019-01-01 DIAGNOSIS — J91.0 PLEURAL EFFUSION, MALIGNANT: ICD-10-CM

## 2019-01-01 DIAGNOSIS — R06.00 DYSPNEA ON EXERTION: ICD-10-CM

## 2019-01-01 DIAGNOSIS — R79.89 ELEVATED SERUM CREATININE: ICD-10-CM

## 2019-01-01 DIAGNOSIS — Z71.89 OTHER SPECIFIED COUNSELING: ICD-10-CM

## 2019-01-01 DIAGNOSIS — R53.1 WEAKNESS: ICD-10-CM

## 2019-01-01 DIAGNOSIS — N18.9 CHRONIC KIDNEY DISEASE, UNSPECIFIED CKD STAGE: ICD-10-CM

## 2019-01-01 DIAGNOSIS — R63.0 LACK OF APPETITE: ICD-10-CM

## 2019-01-01 DIAGNOSIS — F41.8 DEPRESSION WITH ANXIETY: ICD-10-CM

## 2019-01-01 DIAGNOSIS — T45.1X5A CHEMOTHERAPY-INDUCED THROMBOCYTOPENIA: ICD-10-CM

## 2019-01-01 DIAGNOSIS — I95.9 HYPOTENSION, UNSPECIFIED HYPOTENSION TYPE: Primary | ICD-10-CM

## 2019-01-01 DIAGNOSIS — R91.8 MASS OF UPPER LOBE OF LEFT LUNG: Primary | ICD-10-CM

## 2019-01-01 DIAGNOSIS — C79.31 LUNG CANCER METASTATIC TO BRAIN (HCC): Primary | ICD-10-CM

## 2019-01-01 DIAGNOSIS — R59.0 SUPRACLAVICULAR ADENOPATHY: ICD-10-CM

## 2019-01-01 DIAGNOSIS — N18.30 CKD (CHRONIC KIDNEY DISEASE) STAGE 3, GFR 30-59 ML/MIN (HCC): ICD-10-CM

## 2019-01-01 DIAGNOSIS — R91.8 MASS OF UPPER LOBE OF LEFT LUNG: ICD-10-CM

## 2019-01-01 DIAGNOSIS — C34.92 ADENOCARCINOMA OF LEFT LUNG (HCC): ICD-10-CM

## 2019-01-01 DIAGNOSIS — J18.9 COMMUNITY ACQUIRED PNEUMONIA OF LEFT UPPER LOBE OF LUNG: ICD-10-CM

## 2019-01-01 DIAGNOSIS — B37.0 THRUSH: ICD-10-CM

## 2019-01-01 DIAGNOSIS — N18.9 CHRONIC KIDNEY DISEASE, UNSPECIFIED CKD STAGE: Primary | ICD-10-CM

## 2019-01-01 DIAGNOSIS — C34.90 LUNG CANCER METASTATIC TO BRAIN (HCC): ICD-10-CM

## 2019-01-01 DIAGNOSIS — T45.1X5D CHEMOTHERAPY ADVERSE REACTION, SUBSEQUENT ENCOUNTER: ICD-10-CM

## 2019-01-01 DIAGNOSIS — R11.0 NAUSEA: Primary | ICD-10-CM

## 2019-01-01 DIAGNOSIS — R06.02 SHORTNESS OF BREATH: Primary | ICD-10-CM

## 2019-01-01 DIAGNOSIS — R63.0 LACK OF APPETITE: Primary | ICD-10-CM

## 2019-01-01 DIAGNOSIS — R91.8 MASS OF LEFT LUNG: ICD-10-CM

## 2019-01-01 DIAGNOSIS — T45.1X5A ANEMIA ASSOCIATED WITH CHEMOTHERAPY: ICD-10-CM

## 2019-01-01 DIAGNOSIS — J90 PLEURAL EFFUSION: ICD-10-CM

## 2019-01-01 DIAGNOSIS — R04.0 EPISTAXIS: ICD-10-CM

## 2019-01-01 DIAGNOSIS — Z01.812 PRE-PROCEDURE LAB EXAM: Primary | ICD-10-CM

## 2019-01-01 DIAGNOSIS — Z45.2 ENCOUNTER FOR CARE RELATED TO PORT-A-CATH: ICD-10-CM

## 2019-01-01 DIAGNOSIS — C34.90 LUNG CANCER METASTATIC TO BRAIN (HCC): Primary | ICD-10-CM

## 2019-01-01 DIAGNOSIS — C34.92 PRIMARY LUNG CANCER, LEFT (HCC): Primary | ICD-10-CM

## 2019-01-01 DIAGNOSIS — J91.0 MALIGNANT PLEURAL EFFUSION: Primary | ICD-10-CM

## 2019-01-01 DIAGNOSIS — R06.00 DYSPNEA ON EXERTION: Primary | ICD-10-CM

## 2019-01-01 LAB
ALBUMIN SERPL-MCNC: 2.4 G/DL (ref 3.4–5)
ALBUMIN SERPL-MCNC: 2.4 G/DL (ref 3.4–5)
ALBUMIN SERPL-MCNC: 2.5 G/DL (ref 3.4–5)
ALBUMIN SERPL-MCNC: 2.5 G/DL (ref 3.4–5)
ALBUMIN SERPL-MCNC: 2.7 G/DL (ref 3.4–5)
ALBUMIN SERPL-MCNC: 2.7 G/DL (ref 3.4–5)
ALBUMIN SERPL-MCNC: 2.8 G/DL (ref 3.4–5)
ALBUMIN SERPL-MCNC: 2.9 G/DL (ref 3.4–5)
ALBUMIN SERPL-MCNC: 2.9 G/DL (ref 3.4–5)
ALBUMIN/GLOB SERPL: 0.5 {RATIO} (ref 1–2)
ALBUMIN/GLOB SERPL: 0.6 {RATIO} (ref 1–2)
ALBUMIN/GLOB SERPL: 0.7 {RATIO} (ref 1–2)
ALP LIVER SERPL-CCNC: 106 U/L (ref 55–142)
ALP LIVER SERPL-CCNC: 121 U/L (ref 55–142)
ALP LIVER SERPL-CCNC: 130 U/L (ref 55–142)
ALP LIVER SERPL-CCNC: 135 U/L (ref 55–142)
ALP LIVER SERPL-CCNC: 146 U/L (ref 55–142)
ALP LIVER SERPL-CCNC: 149 U/L (ref 55–142)
ALP LIVER SERPL-CCNC: 153 U/L (ref 55–142)
ALP LIVER SERPL-CCNC: 164 U/L (ref 55–142)
ALP LIVER SERPL-CCNC: 168 U/L (ref 55–142)
ALP LIVER SERPL-CCNC: 221 U/L (ref 55–142)
ALP LIVER SERPL-CCNC: 836 U/L (ref 55–142)
ALP LIVER SERPL-CCNC: 85 U/L (ref 55–142)
ALT SERPL-CCNC: 11 U/L (ref 13–56)
ALT SERPL-CCNC: 11 U/L (ref 13–56)
ALT SERPL-CCNC: 12 U/L (ref 13–56)
ALT SERPL-CCNC: 14 U/L (ref 13–56)
ALT SERPL-CCNC: 15 U/L (ref 13–56)
ALT SERPL-CCNC: 17 U/L (ref 13–56)
ALT SERPL-CCNC: 17 U/L (ref 13–56)
ALT SERPL-CCNC: 18 U/L (ref 13–56)
ALT SERPL-CCNC: 22 U/L (ref 13–56)
ANION GAP SERPL CALC-SCNC: 10 MMOL/L (ref 0–18)
ANION GAP SERPL CALC-SCNC: 11 MMOL/L (ref 0–18)
ANION GAP SERPL CALC-SCNC: 13 MMOL/L (ref 0–18)
ANION GAP SERPL CALC-SCNC: 14 MMOL/L (ref 0–18)
ANION GAP SERPL CALC-SCNC: 7 MMOL/L (ref 0–18)
ANION GAP SERPL CALC-SCNC: 7 MMOL/L (ref 0–18)
ANION GAP SERPL CALC-SCNC: 8 MMOL/L (ref 0–18)
ANION GAP SERPL CALC-SCNC: 9 MMOL/L (ref 0–18)
ANTIBODY SCREEN: NEGATIVE
AST SERPL-CCNC: 12 U/L (ref 15–37)
AST SERPL-CCNC: 12 U/L (ref 15–37)
AST SERPL-CCNC: 13 U/L (ref 15–37)
AST SERPL-CCNC: 15 U/L (ref 15–37)
AST SERPL-CCNC: 17 U/L (ref 15–37)
AST SERPL-CCNC: 17 U/L (ref 15–37)
AST SERPL-CCNC: 19 U/L (ref 15–37)
AST SERPL-CCNC: 19 U/L (ref 15–37)
AST SERPL-CCNC: 21 U/L (ref 15–37)
AST SERPL-CCNC: 23 U/L (ref 15–37)
AST SERPL-CCNC: 36 U/L (ref 15–37)
AST SERPL-CCNC: 9 U/L (ref 15–37)
BASOPHILS # BLD AUTO: 0 X10(3) UL (ref 0–0.2)
BASOPHILS # BLD AUTO: 0.01 X10(3) UL (ref 0–0.2)
BASOPHILS # BLD AUTO: 0.02 X10(3) UL (ref 0–0.2)
BASOPHILS # BLD AUTO: 0.04 X10(3) UL (ref 0–0.2)
BASOPHILS # BLD AUTO: 0.04 X10(3) UL (ref 0–0.2)
BASOPHILS # BLD AUTO: 0.05 X10(3) UL (ref 0–0.2)
BASOPHILS # BLD AUTO: 0.07 X10(3) UL (ref 0–0.2)
BASOPHILS # BLD: 0 X10(3) UL (ref 0–0.2)
BASOPHILS NFR BLD AUTO: 0 %
BASOPHILS NFR BLD AUTO: 0.1 %
BASOPHILS NFR BLD AUTO: 0.2 %
BASOPHILS NFR BLD AUTO: 0.3 %
BASOPHILS NFR BLD AUTO: 0.3 %
BASOPHILS NFR BLD AUTO: 0.4 %
BASOPHILS NFR BLD AUTO: 0.6 %
BASOPHILS NFR BLD AUTO: 0.7 %
BASOPHILS NFR BLD AUTO: 0.7 %
BASOPHILS NFR BLD AUTO: 0.8 %
BASOPHILS NFR BLD AUTO: 1.8 %
BASOPHILS NFR BLD: 0 %
BILIRUB DIRECT SERPL-MCNC: 0.4 MG/DL (ref 0–0.2)
BILIRUB DIRECT SERPL-MCNC: 1.1 MG/DL (ref 0–0.2)
BILIRUB SERPL-MCNC: 0.5 MG/DL (ref 0.1–2)
BILIRUB SERPL-MCNC: 0.6 MG/DL (ref 0.1–2)
BILIRUB SERPL-MCNC: 0.8 MG/DL (ref 0.1–2)
BILIRUB SERPL-MCNC: 0.8 MG/DL (ref 0.1–2)
BILIRUB SERPL-MCNC: 0.9 MG/DL (ref 0.1–2)
BILIRUB SERPL-MCNC: 1 MG/DL (ref 0.1–2)
BILIRUB SERPL-MCNC: 1.1 MG/DL (ref 0.1–2)
BILIRUB SERPL-MCNC: 1.2 MG/DL (ref 0.1–2)
BILIRUB SERPL-MCNC: 2.2 MG/DL (ref 0.1–2)
BLOOD TYPE BARCODE: 600
BLOOD TYPE BARCODE: 600
BLOOD TYPE BARCODE: 6200
BUN BLD-MCNC: 10 MG/DL (ref 7–18)
BUN BLD-MCNC: 11 MG/DL (ref 7–18)
BUN BLD-MCNC: 11 MG/DL (ref 7–18)
BUN BLD-MCNC: 14 MG/DL (ref 7–18)
BUN BLD-MCNC: 14 MG/DL (ref 7–18)
BUN BLD-MCNC: 15 MG/DL (ref 7–18)
BUN BLD-MCNC: 16 MG/DL (ref 7–18)
BUN BLD-MCNC: 16 MG/DL (ref 7–18)
BUN BLD-MCNC: 19 MG/DL (ref 7–18)
BUN BLD-MCNC: 19 MG/DL (ref 7–18)
BUN BLD-MCNC: 23 MG/DL (ref 7–18)
BUN BLD-MCNC: 26 MG/DL (ref 7–18)
BUN BLD-MCNC: 26 MG/DL (ref 7–18)
BUN BLD-MCNC: 29 MG/DL (ref 7–18)
BUN/CREAT SERPL: 10 (ref 10–20)
BUN/CREAT SERPL: 10.8 (ref 10–20)
BUN/CREAT SERPL: 11.3 (ref 10–20)
BUN/CREAT SERPL: 12.3 (ref 10–20)
BUN/CREAT SERPL: 13.2 (ref 10–20)
BUN/CREAT SERPL: 13.4 (ref 10–20)
BUN/CREAT SERPL: 14 (ref 10–20)
BUN/CREAT SERPL: 14.1 (ref 10–20)
BUN/CREAT SERPL: 14.7 (ref 10–20)
BUN/CREAT SERPL: 5.8 (ref 10–20)
BUN/CREAT SERPL: 6 (ref 10–20)
BUN/CREAT SERPL: 6.6 (ref 10–20)
BUN/CREAT SERPL: 8.3 (ref 10–20)
BUN/CREAT SERPL: 9.2 (ref 10–20)
BUN/CREAT SERPL: 9.4 (ref 10–20)
BUN/CREAT SERPL: 9.4 (ref 10–20)
BUN/CREAT SERPL: 9.7 (ref 10–20)
CALCIUM BLD-MCNC: 8.1 MG/DL (ref 8.5–10.1)
CALCIUM BLD-MCNC: 8.4 MG/DL (ref 8.5–10.1)
CALCIUM BLD-MCNC: 8.5 MG/DL (ref 8.5–10.1)
CALCIUM BLD-MCNC: 8.6 MG/DL (ref 8.5–10.1)
CALCIUM BLD-MCNC: 8.6 MG/DL (ref 8.5–10.1)
CALCIUM BLD-MCNC: 8.7 MG/DL (ref 8.5–10.1)
CALCIUM BLD-MCNC: 8.7 MG/DL (ref 8.5–10.1)
CALCIUM BLD-MCNC: 8.8 MG/DL (ref 8.5–10.1)
CALCIUM BLD-MCNC: 8.8 MG/DL (ref 8.5–10.1)
CALCIUM BLD-MCNC: 8.9 MG/DL (ref 8.5–10.1)
CALCIUM BLD-MCNC: 8.9 MG/DL (ref 8.5–10.1)
CALCIUM BLD-MCNC: 9 MG/DL (ref 8.5–10.1)
CALCIUM BLD-MCNC: 9.1 MG/DL (ref 8.5–10.1)
CALCIUM BLD-MCNC: 9.2 MG/DL (ref 8.5–10.1)
CALCIUM BLD-MCNC: 9.2 MG/DL (ref 8.5–10.1)
CEA SERPL-MCNC: 115 NG/ML (ref ?–5)
CEA SERPL-MCNC: 143.4 NG/ML (ref ?–5)
CEA SERPL-MCNC: 272.8 NG/ML (ref ?–5)
CEA SERPL-MCNC: 333.8 NG/ML (ref ?–5)
CHLORIDE SERPL-SCNC: 100 MMOL/L (ref 98–112)
CHLORIDE SERPL-SCNC: 101 MMOL/L (ref 98–112)
CHLORIDE SERPL-SCNC: 102 MMOL/L (ref 98–112)
CHLORIDE SERPL-SCNC: 102 MMOL/L (ref 98–112)
CHLORIDE SERPL-SCNC: 104 MMOL/L (ref 98–112)
CHLORIDE SERPL-SCNC: 95 MMOL/L (ref 98–112)
CHLORIDE SERPL-SCNC: 97 MMOL/L (ref 98–112)
CHLORIDE SERPL-SCNC: 98 MMOL/L (ref 98–112)
CHLORIDE SERPL-SCNC: 98 MMOL/L (ref 98–112)
CO2 SERPL-SCNC: 21 MMOL/L (ref 21–32)
CO2 SERPL-SCNC: 22 MMOL/L (ref 21–32)
CO2 SERPL-SCNC: 22 MMOL/L (ref 21–32)
CO2 SERPL-SCNC: 23 MMOL/L (ref 21–32)
CO2 SERPL-SCNC: 23 MMOL/L (ref 21–32)
CO2 SERPL-SCNC: 24 MMOL/L (ref 21–32)
CO2 SERPL-SCNC: 25 MMOL/L (ref 21–32)
CO2 SERPL-SCNC: 26 MMOL/L (ref 21–32)
CO2 SERPL-SCNC: 26 MMOL/L (ref 21–32)
CO2 SERPL-SCNC: 27 MMOL/L (ref 21–32)
CO2 SERPL-SCNC: 28 MMOL/L (ref 21–32)
CO2 SERPL-SCNC: 28 MMOL/L (ref 21–32)
CO2 SERPL-SCNC: 29 MMOL/L (ref 21–32)
CO2 SERPL-SCNC: 31 MMOL/L (ref 21–32)
CREAT BLD-MCNC: 1.33 MG/DL (ref 0.55–1.02)
CREAT BLD-MCNC: 1.44 MG/DL (ref 0.55–1.02)
CREAT BLD-MCNC: 1.44 MG/DL (ref 0.55–1.02)
CREAT BLD-MCNC: 1.48 MG/DL (ref 0.55–1.02)
CREAT BLD-MCNC: 1.5 MG/DL (ref 0.55–1.02)
CREAT BLD-MCNC: 1.52 MG/DL (ref 0.55–1.02)
CREAT BLD-MCNC: 1.54 MG/DL (ref 0.55–1.02)
CREAT BLD-MCNC: 1.6 MG/DL (ref 0.55–1.02)
CREAT BLD-MCNC: 1.66 MG/DL (ref 0.55–1.02)
CREAT BLD-MCNC: 1.71 MG/DL (ref 0.55–1.02)
CREAT BLD-MCNC: 1.72 MG/DL (ref 0.55–1.02)
CREAT BLD-MCNC: 1.73 MG/DL (ref 0.55–1.02)
CREAT BLD-MCNC: 1.77 MG/DL (ref 0.55–1.02)
CREAT BLD-MCNC: 1.8 MG/DL (ref 0.55–1.02)
CREAT BLD-MCNC: 1.83 MG/DL (ref 0.55–1.02)
CREAT BLD-MCNC: 1.85 MG/DL (ref 0.55–1.02)
CREAT BLD-MCNC: 2.07 MG/DL (ref 0.55–1.02)
DEPRECATED HBV CORE AB SER IA-ACNC: 1189.6 NG/ML (ref 18–340)
DEPRECATED HBV CORE AB SER IA-ACNC: 1431.8 NG/ML (ref 18–340)
DEPRECATED RDW RBC AUTO: 46.8 FL (ref 35.1–46.3)
DEPRECATED RDW RBC AUTO: 46.9 FL (ref 35.1–46.3)
DEPRECATED RDW RBC AUTO: 47.6 FL (ref 35.1–46.3)
DEPRECATED RDW RBC AUTO: 48.2 FL (ref 35.1–46.3)
DEPRECATED RDW RBC AUTO: 48.6 FL (ref 35.1–46.3)
DEPRECATED RDW RBC AUTO: 49.3 FL (ref 35.1–46.3)
DEPRECATED RDW RBC AUTO: 50.7 FL (ref 35.1–46.3)
DEPRECATED RDW RBC AUTO: 51.2 FL (ref 35.1–46.3)
DEPRECATED RDW RBC AUTO: 51.7 FL (ref 35.1–46.3)
DEPRECATED RDW RBC AUTO: 54.4 FL (ref 35.1–46.3)
DEPRECATED RDW RBC AUTO: 54.7 FL (ref 35.1–46.3)
DEPRECATED RDW RBC AUTO: 55.1 FL (ref 35.1–46.3)
DEPRECATED RDW RBC AUTO: 55.5 FL (ref 35.1–46.3)
DEPRECATED RDW RBC AUTO: 58.3 FL (ref 35.1–46.3)
DEPRECATED RDW RBC AUTO: 59 FL (ref 35.1–46.3)
DEPRECATED RDW RBC AUTO: 61.2 FL (ref 35.1–46.3)
DEPRECATED RDW RBC AUTO: 63.1 FL (ref 35.1–46.3)
EOSINOPHIL # BLD AUTO: 0 X10(3) UL (ref 0–0.7)
EOSINOPHIL # BLD AUTO: 0.01 X10(3) UL (ref 0–0.7)
EOSINOPHIL # BLD AUTO: 0.02 X10(3) UL (ref 0–0.7)
EOSINOPHIL # BLD AUTO: 0.03 X10(3) UL (ref 0–0.7)
EOSINOPHIL # BLD AUTO: 0.07 X10(3) UL (ref 0–0.7)
EOSINOPHIL # BLD AUTO: 0.2 X10(3) UL (ref 0–0.7)
EOSINOPHIL # BLD: 0 X10(3) UL (ref 0–0.7)
EOSINOPHIL NFR BLD AUTO: 0 %
EOSINOPHIL NFR BLD AUTO: 0.1 %
EOSINOPHIL NFR BLD AUTO: 0.3 %
EOSINOPHIL NFR BLD AUTO: 0.4 %
EOSINOPHIL NFR BLD AUTO: 0.8 %
EOSINOPHIL NFR BLD AUTO: 1.2 %
EOSINOPHIL NFR BLD AUTO: 1.6 %
EOSINOPHIL NFR BLD AUTO: 2.8 %
EOSINOPHIL NFR BLD: 0 %
ERYTHROCYTE [DISTWIDTH] IN BLOOD BY AUTOMATED COUNT: 14.6 % (ref 11–15)
ERYTHROCYTE [DISTWIDTH] IN BLOOD BY AUTOMATED COUNT: 14.7 % (ref 11–15)
ERYTHROCYTE [DISTWIDTH] IN BLOOD BY AUTOMATED COUNT: 15 % (ref 11–15)
ERYTHROCYTE [DISTWIDTH] IN BLOOD BY AUTOMATED COUNT: 15.1 % (ref 11–15)
ERYTHROCYTE [DISTWIDTH] IN BLOOD BY AUTOMATED COUNT: 15.3 % (ref 11–15)
ERYTHROCYTE [DISTWIDTH] IN BLOOD BY AUTOMATED COUNT: 15.6 % (ref 11–15)
ERYTHROCYTE [DISTWIDTH] IN BLOOD BY AUTOMATED COUNT: 15.8 % (ref 11–15)
ERYTHROCYTE [DISTWIDTH] IN BLOOD BY AUTOMATED COUNT: 16.6 % (ref 11–15)
ERYTHROCYTE [DISTWIDTH] IN BLOOD BY AUTOMATED COUNT: 16.8 % (ref 11–15)
ERYTHROCYTE [DISTWIDTH] IN BLOOD BY AUTOMATED COUNT: 17 % (ref 11–15)
ERYTHROCYTE [DISTWIDTH] IN BLOOD BY AUTOMATED COUNT: 17 % (ref 11–15)
ERYTHROCYTE [DISTWIDTH] IN BLOOD BY AUTOMATED COUNT: 17.2 % (ref 11–15)
ERYTHROCYTE [DISTWIDTH] IN BLOOD BY AUTOMATED COUNT: 17.2 % (ref 11–15)
ERYTHROCYTE [DISTWIDTH] IN BLOOD BY AUTOMATED COUNT: 17.7 % (ref 11–15)
ERYTHROCYTE [DISTWIDTH] IN BLOOD BY AUTOMATED COUNT: 17.9 % (ref 11–15)
ERYTHROCYTE [DISTWIDTH] IN BLOOD BY AUTOMATED COUNT: 18.6 % (ref 11–15)
ERYTHROCYTE [DISTWIDTH] IN BLOOD BY AUTOMATED COUNT: 19.9 % (ref 11–15)
FOLATE SERPL-MCNC: 66.1 NG/ML (ref 8.7–?)
GLOBULIN PLAS-MCNC: 4 G/DL (ref 2.8–4.4)
GLOBULIN PLAS-MCNC: 4.1 G/DL (ref 2.8–4.4)
GLOBULIN PLAS-MCNC: 4.3 G/DL (ref 2.8–4.4)
GLOBULIN PLAS-MCNC: 4.3 G/DL (ref 2.8–4.4)
GLOBULIN PLAS-MCNC: 4.4 G/DL (ref 2.8–4.4)
GLOBULIN PLAS-MCNC: 4.5 G/DL (ref 2.8–4.4)
GLOBULIN PLAS-MCNC: 4.7 G/DL (ref 2.8–4.4)
GLOBULIN PLAS-MCNC: 4.9 G/DL (ref 2.8–4.4)
GLUCOSE BLD-MCNC: 108 MG/DL (ref 70–99)
GLUCOSE BLD-MCNC: 108 MG/DL (ref 70–99)
GLUCOSE BLD-MCNC: 109 MG/DL (ref 70–99)
GLUCOSE BLD-MCNC: 110 MG/DL (ref 70–99)
GLUCOSE BLD-MCNC: 110 MG/DL (ref 70–99)
GLUCOSE BLD-MCNC: 112 MG/DL (ref 70–99)
GLUCOSE BLD-MCNC: 113 MG/DL (ref 70–99)
GLUCOSE BLD-MCNC: 116 MG/DL (ref 70–99)
GLUCOSE BLD-MCNC: 118 MG/DL (ref 70–99)
GLUCOSE BLD-MCNC: 121 MG/DL (ref 70–99)
GLUCOSE BLD-MCNC: 127 MG/DL (ref 70–99)
GLUCOSE BLD-MCNC: 130 MG/DL (ref 70–99)
GLUCOSE BLD-MCNC: 131 MG/DL (ref 70–99)
GLUCOSE BLD-MCNC: 132 MG/DL (ref 70–99)
GLUCOSE BLD-MCNC: 145 MG/DL (ref 70–99)
GLUCOSE BLD-MCNC: 158 MG/DL (ref 70–99)
GLUCOSE BLD-MCNC: 161 MG/DL (ref 70–99)
HAV IGM SER QL: 0.8 MG/DL (ref 1.6–2.6)
HAV IGM SER QL: 1.2 MG/DL (ref 1.6–2.6)
HAV IGM SER QL: 1.3 MG/DL (ref 1.6–2.6)
HAV IGM SER QL: 1.3 MG/DL (ref 1.6–2.6)
HAV IGM SER QL: 1.5 MG/DL (ref 1.6–2.6)
HAV IGM SER QL: 1.5 MG/DL (ref 1.6–2.6)
HAV IGM SER QL: 1.6 MG/DL (ref 1.6–2.6)
HAV IGM SER QL: 1.6 MG/DL (ref 1.6–2.6)
HCT VFR BLD AUTO: 18 % (ref 35–48)
HCT VFR BLD AUTO: 21 % (ref 35–48)
HCT VFR BLD AUTO: 22 % (ref 35–48)
HCT VFR BLD AUTO: 22.1 % (ref 35–48)
HCT VFR BLD AUTO: 22.1 % (ref 35–48)
HCT VFR BLD AUTO: 22.4 % (ref 35–48)
HCT VFR BLD AUTO: 22.6 % (ref 35–48)
HCT VFR BLD AUTO: 22.6 % (ref 35–48)
HCT VFR BLD AUTO: 22.7 % (ref 35–48)
HCT VFR BLD AUTO: 23.6 % (ref 35–48)
HCT VFR BLD AUTO: 24.1 % (ref 35–48)
HCT VFR BLD AUTO: 24.5 % (ref 35–48)
HCT VFR BLD AUTO: 24.6 % (ref 35–48)
HCT VFR BLD AUTO: 24.9 % (ref 35–48)
HCT VFR BLD AUTO: 25.1 % (ref 35–48)
HCT VFR BLD AUTO: 25.9 % (ref 35–48)
HCT VFR BLD AUTO: 27.5 % (ref 35–48)
HGB BLD-MCNC: 5.8 G/DL (ref 12–16)
HGB BLD-MCNC: 6.3 G/DL (ref 12–16)
HGB BLD-MCNC: 6.4 G/DL (ref 12–16)
HGB BLD-MCNC: 6.8 G/DL (ref 12–16)
HGB BLD-MCNC: 7 G/DL (ref 12–16)
HGB BLD-MCNC: 7.1 G/DL (ref 12–16)
HGB BLD-MCNC: 7.2 G/DL (ref 12–16)
HGB BLD-MCNC: 7.2 G/DL (ref 12–16)
HGB BLD-MCNC: 7.4 G/DL (ref 12–16)
HGB BLD-MCNC: 7.4 G/DL (ref 12–16)
HGB BLD-MCNC: 7.5 G/DL (ref 12–16)
HGB BLD-MCNC: 7.7 G/DL (ref 12–16)
HGB BLD-MCNC: 7.7 G/DL (ref 12–16)
HGB BLD-MCNC: 7.8 G/DL (ref 12–16)
HGB BLD-MCNC: 7.9 G/DL (ref 12–16)
HGB BLD-MCNC: 8.1 G/DL (ref 12–16)
HGB BLD-MCNC: 8.1 G/DL (ref 12–16)
HGB BLD-MCNC: 8.5 G/DL (ref 12–16)
HGB BLD-MCNC: 8.7 G/DL (ref 12–16)
HGB RETIC QN AUTO: 32.3 PG (ref 28.2–36.6)
HGB RETIC QN AUTO: 35.5 PG (ref 28.2–36.6)
IMM GRANULOCYTES # BLD AUTO: 0 X10(3) UL (ref 0–1)
IMM GRANULOCYTES # BLD AUTO: 0 X10(3) UL (ref 0–1)
IMM GRANULOCYTES # BLD AUTO: 0.02 X10(3) UL (ref 0–1)
IMM GRANULOCYTES # BLD AUTO: 0.04 X10(3) UL (ref 0–1)
IMM GRANULOCYTES # BLD AUTO: 0.05 X10(3) UL (ref 0–1)
IMM GRANULOCYTES # BLD AUTO: 0.06 X10(3) UL (ref 0–1)
IMM GRANULOCYTES # BLD AUTO: 0.17 X10(3) UL (ref 0–1)
IMM GRANULOCYTES # BLD AUTO: 0.19 X10(3) UL (ref 0–1)
IMM GRANULOCYTES # BLD AUTO: 0.23 X10(3) UL (ref 0–1)
IMM GRANULOCYTES NFR BLD: 0 %
IMM GRANULOCYTES NFR BLD: 0 %
IMM GRANULOCYTES NFR BLD: 0.3 %
IMM GRANULOCYTES NFR BLD: 0.3 %
IMM GRANULOCYTES NFR BLD: 0.6 %
IMM GRANULOCYTES NFR BLD: 1.1 %
IMM GRANULOCYTES NFR BLD: 1.2 %
IMM GRANULOCYTES NFR BLD: 1.7 %
IMM GRANULOCYTES NFR BLD: 1.7 %
IMM GRANULOCYTES NFR BLD: 3.4 %
IMM GRANULOCYTES NFR BLD: 4.6 %
IMM RETICS NFR: 0.24 RATIO (ref 0.1–0.3)
IMM RETICS NFR: 0.32 RATIO (ref 0.1–0.3)
IRON SATURATION: 21 % (ref 15–50)
IRON SATURATION: 33 % (ref 15–50)
IRON SERPL-MCNC: 44 UG/DL (ref 50–170)
IRON SERPL-MCNC: 69 UG/DL (ref 50–170)
LDH SERPL L TO P-CCNC: 220 U/L (ref 84–246)
LYMPHOCYTES # BLD AUTO: 0.31 X10(3) UL (ref 1–4)
LYMPHOCYTES # BLD AUTO: 0.38 X10(3) UL (ref 1–4)
LYMPHOCYTES # BLD AUTO: 0.52 X10(3) UL (ref 1–4)
LYMPHOCYTES # BLD AUTO: 0.56 X10(3) UL (ref 1–4)
LYMPHOCYTES # BLD AUTO: 0.56 X10(3) UL (ref 1–4)
LYMPHOCYTES # BLD AUTO: 0.6 X10(3) UL (ref 1–4)
LYMPHOCYTES # BLD AUTO: 0.6 X10(3) UL (ref 1–4)
LYMPHOCYTES # BLD AUTO: 0.73 X10(3) UL (ref 1–4)
LYMPHOCYTES # BLD AUTO: 0.78 X10(3) UL (ref 1–4)
LYMPHOCYTES # BLD AUTO: 0.78 X10(3) UL (ref 1–4)
LYMPHOCYTES # BLD AUTO: 0.93 X10(3) UL (ref 1–4)
LYMPHOCYTES # BLD AUTO: 1.09 X10(3) UL (ref 1–4)
LYMPHOCYTES # BLD AUTO: 1.16 X10(3) UL (ref 1–4)
LYMPHOCYTES NFR BLD AUTO: 10.4 %
LYMPHOCYTES NFR BLD AUTO: 11.3 %
LYMPHOCYTES NFR BLD AUTO: 12.2 %
LYMPHOCYTES NFR BLD AUTO: 15.1 %
LYMPHOCYTES NFR BLD AUTO: 21.3 %
LYMPHOCYTES NFR BLD AUTO: 22.6 %
LYMPHOCYTES NFR BLD AUTO: 30.9 %
LYMPHOCYTES NFR BLD AUTO: 47.1 %
LYMPHOCYTES NFR BLD AUTO: 48.8 %
LYMPHOCYTES NFR BLD AUTO: 56.4 %
LYMPHOCYTES NFR BLD AUTO: 6 %
LYMPHOCYTES NFR BLD AUTO: 8.8 %
LYMPHOCYTES NFR BLD AUTO: 9.5 %
LYMPHOCYTES NFR BLD: 0.83 X10(3) UL (ref 1–4)
LYMPHOCYTES NFR BLD: 1.05 X10(3) UL (ref 1–4)
LYMPHOCYTES NFR BLD: 1.05 X10(3) UL (ref 1–4)
LYMPHOCYTES NFR BLD: 1.65 X10(3) UL (ref 1–4)
LYMPHOCYTES NFR BLD: 14 %
LYMPHOCYTES NFR BLD: 4 %
LYMPHOCYTES NFR BLD: 6 %
LYMPHOCYTES NFR BLD: 8 %
M PROTEIN MFR SERPL ELPH: 6.4 G/DL (ref 6.4–8.2)
M PROTEIN MFR SERPL ELPH: 6.7 G/DL (ref 6.4–8.2)
M PROTEIN MFR SERPL ELPH: 6.8 G/DL (ref 6.4–8.2)
M PROTEIN MFR SERPL ELPH: 7 G/DL (ref 6.4–8.2)
M PROTEIN MFR SERPL ELPH: 7.1 G/DL (ref 6.4–8.2)
M PROTEIN MFR SERPL ELPH: 7.2 G/DL (ref 6.4–8.2)
M PROTEIN MFR SERPL ELPH: 7.4 G/DL (ref 6.4–8.2)
M PROTEIN MFR SERPL ELPH: 7.5 G/DL (ref 6.4–8.2)
M PROTEIN MFR SERPL ELPH: 7.6 G/DL (ref 6.4–8.2)
M PROTEIN MFR SERPL ELPH: 7.6 G/DL (ref 6.4–8.2)
MCH RBC QN AUTO: 26.6 PG (ref 26–34)
MCH RBC QN AUTO: 27.1 PG (ref 26–34)
MCH RBC QN AUTO: 27.9 PG (ref 26–34)
MCH RBC QN AUTO: 28 PG (ref 26–34)
MCH RBC QN AUTO: 28.6 PG (ref 26–34)
MCH RBC QN AUTO: 28.6 PG (ref 26–34)
MCH RBC QN AUTO: 28.7 PG (ref 26–34)
MCH RBC QN AUTO: 28.9 PG (ref 26–34)
MCH RBC QN AUTO: 28.9 PG (ref 26–34)
MCH RBC QN AUTO: 29.1 PG (ref 26–34)
MCH RBC QN AUTO: 29.3 PG (ref 26–34)
MCH RBC QN AUTO: 29.7 PG (ref 26–34)
MCH RBC QN AUTO: 29.9 PG (ref 26–34)
MCH RBC QN AUTO: 30.1 PG (ref 26–34)
MCH RBC QN AUTO: 30.4 PG (ref 26–34)
MCH RBC QN AUTO: 30.7 PG (ref 26–34)
MCH RBC QN AUTO: 31.6 PG (ref 26–34)
MCHC RBC AUTO-ENTMCNC: 30.7 G/DL (ref 31–37)
MCHC RBC AUTO-ENTMCNC: 31.1 G/DL (ref 31–37)
MCHC RBC AUTO-ENTMCNC: 31.6 G/DL (ref 31–37)
MCHC RBC AUTO-ENTMCNC: 31.7 G/DL (ref 31–37)
MCHC RBC AUTO-ENTMCNC: 31.7 G/DL (ref 31–37)
MCHC RBC AUTO-ENTMCNC: 32.2 G/DL (ref 31–37)
MCHC RBC AUTO-ENTMCNC: 32.2 G/DL (ref 31–37)
MCHC RBC AUTO-ENTMCNC: 32.4 G/DL (ref 31–37)
MCHC RBC AUTO-ENTMCNC: 32.5 G/DL (ref 31–37)
MCHC RBC AUTO-ENTMCNC: 32.6 G/DL (ref 31–37)
MCHC RBC AUTO-ENTMCNC: 32.6 G/DL (ref 31–37)
MCHC RBC AUTO-ENTMCNC: 32.7 G/DL (ref 31–37)
MCHC RBC AUTO-ENTMCNC: 32.7 G/DL (ref 31–37)
MCHC RBC AUTO-ENTMCNC: 32.8 G/DL (ref 31–37)
MCHC RBC AUTO-ENTMCNC: 32.9 G/DL (ref 31–37)
MCHC RBC AUTO-ENTMCNC: 33 G/DL (ref 31–37)
MCHC RBC AUTO-ENTMCNC: 34.1 G/DL (ref 31–37)
MCV RBC AUTO: 85.3 FL (ref 80–100)
MCV RBC AUTO: 85.5 FL (ref 80–100)
MCV RBC AUTO: 86.7 FL (ref 80–100)
MCV RBC AUTO: 87.2 FL (ref 80–100)
MCV RBC AUTO: 87.2 FL (ref 80–100)
MCV RBC AUTO: 87.3 FL (ref 80–100)
MCV RBC AUTO: 88.8 FL (ref 80–100)
MCV RBC AUTO: 89.7 FL (ref 80–100)
MCV RBC AUTO: 90 FL (ref 80–100)
MCV RBC AUTO: 90.2 FL (ref 80–100)
MCV RBC AUTO: 90.5 FL (ref 80–100)
MCV RBC AUTO: 90.5 FL (ref 80–100)
MCV RBC AUTO: 91.9 FL (ref 80–100)
MCV RBC AUTO: 92.2 FL (ref 80–100)
MCV RBC AUTO: 92.8 FL (ref 80–100)
MCV RBC AUTO: 95.8 FL (ref 80–100)
MCV RBC AUTO: 97.2 FL (ref 80–100)
METAMYELOCYTES # BLD: 0.13 X10(3) UL
METAMYELOCYTES # BLD: 0.21 X10(3) UL
METAMYELOCYTES # BLD: 0.35 X10(3) UL
METAMYELOCYTES # BLD: 0.35 X10(3) UL
METAMYELOCYTES NFR BLD: 1 %
METAMYELOCYTES NFR BLD: 1 %
METAMYELOCYTES NFR BLD: 2 %
METAMYELOCYTES NFR BLD: 3 %
MONOCYTES # BLD AUTO: 0.05 X10(3) UL (ref 0.1–1)
MONOCYTES # BLD AUTO: 0.06 X10(3) UL (ref 0.1–1)
MONOCYTES # BLD AUTO: 0.06 X10(3) UL (ref 0.1–1)
MONOCYTES # BLD AUTO: 0.08 X10(3) UL (ref 0.1–1)
MONOCYTES # BLD AUTO: 0.09 X10(3) UL (ref 0.1–1)
MONOCYTES # BLD AUTO: 0.17 X10(3) UL (ref 0.1–1)
MONOCYTES # BLD AUTO: 0.17 X10(3) UL (ref 0.1–1)
MONOCYTES # BLD AUTO: 0.35 X10(3) UL (ref 0.1–1)
MONOCYTES # BLD AUTO: 0.42 X10(3) UL (ref 0.1–1)
MONOCYTES # BLD AUTO: 0.44 X10(3) UL (ref 0.1–1)
MONOCYTES # BLD AUTO: 0.54 X10(3) UL (ref 0.1–1)
MONOCYTES # BLD AUTO: 0.95 X10(3) UL (ref 0.1–1)
MONOCYTES # BLD AUTO: 1.25 X10(3) UL (ref 0.1–1)
MONOCYTES # BLD: 0.88 X10(3) UL (ref 0.1–1)
MONOCYTES # BLD: 1.04 X10(3) UL (ref 0.1–1)
MONOCYTES # BLD: 1.31 X10(3) UL (ref 0.1–1)
MONOCYTES # BLD: 1.89 X10(3) UL (ref 0.1–1)
MONOCYTES NFR BLD AUTO: 0.7 %
MONOCYTES NFR BLD AUTO: 1 %
MONOCYTES NFR BLD AUTO: 11.2 %
MONOCYTES NFR BLD AUTO: 11.5 %
MONOCYTES NFR BLD AUTO: 13.8 %
MONOCYTES NFR BLD AUTO: 14.3 %
MONOCYTES NFR BLD AUTO: 16.4 %
MONOCYTES NFR BLD AUTO: 3.3 %
MONOCYTES NFR BLD AUTO: 4 %
MONOCYTES NFR BLD AUTO: 4.8 %
MONOCYTES NFR BLD AUTO: 6.4 %
MONOCYTES NFR BLD AUTO: 7.5 %
MONOCYTES NFR BLD AUTO: 9.7 %
MONOCYTES NFR BLD: 10 %
MONOCYTES NFR BLD: 16 %
MONOCYTES NFR BLD: 5 %
MONOCYTES NFR BLD: 5 %
MORPHOLOGY: NORMAL
MYELOCYTES # BLD: 0.21 X10(3) UL
MYELOCYTES # BLD: 0.24 X10(3) UL
MYELOCYTES NFR BLD: 1 %
MYELOCYTES NFR BLD: 2 %
NEUTROPHILS # BLD AUTO: 0.14 X10 (3) UL (ref 1.5–7.7)
NEUTROPHILS # BLD AUTO: 0.14 X10(3) UL (ref 1.5–7.7)
NEUTROPHILS # BLD AUTO: 0.42 X10 (3) UL (ref 1.5–7.7)
NEUTROPHILS # BLD AUTO: 0.42 X10(3) UL (ref 1.5–7.7)
NEUTROPHILS # BLD AUTO: 0.44 X10 (3) UL (ref 1.5–7.7)
NEUTROPHILS # BLD AUTO: 0.44 X10(3) UL (ref 1.5–7.7)
NEUTROPHILS # BLD AUTO: 1.16 X10 (3) UL (ref 1.5–7.7)
NEUTROPHILS # BLD AUTO: 1.16 X10(3) UL (ref 1.5–7.7)
NEUTROPHILS # BLD AUTO: 1.19 X10 (3) UL (ref 1.5–7.7)
NEUTROPHILS # BLD AUTO: 1.19 X10(3) UL (ref 1.5–7.7)
NEUTROPHILS # BLD AUTO: 13.93 X10 (3) UL (ref 1.5–7.7)
NEUTROPHILS # BLD AUTO: 18.3 X10 (3) UL (ref 1.5–7.7)
NEUTROPHILS # BLD AUTO: 2.27 X10 (3) UL (ref 1.5–7.7)
NEUTROPHILS # BLD AUTO: 2.27 X10(3) UL (ref 1.5–7.7)
NEUTROPHILS # BLD AUTO: 5.11 X10 (3) UL (ref 1.5–7.7)
NEUTROPHILS # BLD AUTO: 5.11 X10(3) UL (ref 1.5–7.7)
NEUTROPHILS # BLD AUTO: 5.3 X10 (3) UL (ref 1.5–7.7)
NEUTROPHILS # BLD AUTO: 5.3 X10(3) UL (ref 1.5–7.7)
NEUTROPHILS # BLD AUTO: 5.59 X10 (3) UL (ref 1.5–7.7)
NEUTROPHILS # BLD AUTO: 5.59 X10(3) UL (ref 1.5–7.7)
NEUTROPHILS # BLD AUTO: 5.94 X10 (3) UL (ref 1.5–7.7)
NEUTROPHILS # BLD AUTO: 5.94 X10(3) UL (ref 1.5–7.7)
NEUTROPHILS # BLD AUTO: 6.02 X10 (3) UL (ref 1.5–7.7)
NEUTROPHILS # BLD AUTO: 7.76 X10 (3) UL (ref 1.5–7.7)
NEUTROPHILS # BLD AUTO: 7.76 X10(3) UL (ref 1.5–7.7)
NEUTROPHILS # BLD AUTO: 7.77 X10 (3) UL (ref 1.5–7.7)
NEUTROPHILS # BLD AUTO: 7.77 X10(3) UL (ref 1.5–7.7)
NEUTROPHILS # BLD AUTO: 8.51 X10 (3) UL (ref 1.5–7.7)
NEUTROPHILS # BLD AUTO: 8.51 X10(3) UL (ref 1.5–7.7)
NEUTROPHILS # BLD AUTO: 9.2 X10 (3) UL (ref 1.5–7.7)
NEUTROPHILS NFR BLD AUTO: 25.4 %
NEUTROPHILS NFR BLD AUTO: 35.3 %
NEUTROPHILS NFR BLD AUTO: 35.8 %
NEUTROPHILS NFR BLD AUTO: 62 %
NEUTROPHILS NFR BLD AUTO: 64.1 %
NEUTROPHILS NFR BLD AUTO: 70.8 %
NEUTROPHILS NFR BLD AUTO: 73.3 %
NEUTROPHILS NFR BLD AUTO: 76.3 %
NEUTROPHILS NFR BLD AUTO: 79.5 %
NEUTROPHILS NFR BLD AUTO: 81 %
NEUTROPHILS NFR BLD AUTO: 85.1 %
NEUTROPHILS NFR BLD AUTO: 86.8 %
NEUTROPHILS NFR BLD AUTO: 89.2 %
NEUTROPHILS NFR BLD: 52 %
NEUTROPHILS NFR BLD: 78 %
NEUTROPHILS NFR BLD: 86 %
NEUTROPHILS NFR BLD: 88 %
NEUTS BAND NFR BLD: 1 %
NEUTS BAND NFR BLD: 1 %
NEUTS BAND NFR BLD: 13 %
NEUTS BAND NFR BLD: 3 %
NEUTS HYPERSEG # BLD: 10.61 X10(3) UL (ref 1.5–7.7)
NEUTS HYPERSEG # BLD: 15.23 X10(3) UL (ref 1.5–7.7)
NEUTS HYPERSEG # BLD: 18.42 X10(3) UL (ref 1.5–7.7)
NEUTS HYPERSEG # BLD: 7.67 X10(3) UL (ref 1.5–7.7)
NRBC BLD MANUAL-RTO: 0 %
NRBC BLD MANUAL-RTO: 1 %
OSMOLALITY SERPL CALC.SUM OF ELEC: 277 MOSM/KG (ref 275–295)
OSMOLALITY SERPL CALC.SUM OF ELEC: 278 MOSM/KG (ref 275–295)
OSMOLALITY SERPL CALC.SUM OF ELEC: 279 MOSM/KG (ref 275–295)
OSMOLALITY SERPL CALC.SUM OF ELEC: 280 MOSM/KG (ref 275–295)
OSMOLALITY SERPL CALC.SUM OF ELEC: 283 MOSM/KG (ref 275–295)
OSMOLALITY SERPL CALC.SUM OF ELEC: 284 MOSM/KG (ref 275–295)
OSMOLALITY SERPL CALC.SUM OF ELEC: 285 MOSM/KG (ref 275–295)
OSMOLALITY SERPL CALC.SUM OF ELEC: 286 MOSM/KG (ref 275–295)
OSMOLALITY SERPL CALC.SUM OF ELEC: 288 MOSM/KG (ref 275–295)
OSMOLALITY SERPL CALC.SUM OF ELEC: 289 MOSM/KG (ref 275–295)
PLATELET # BLD AUTO: 125 10(3)UL (ref 150–450)
PLATELET # BLD AUTO: 13 10(3)UL (ref 150–450)
PLATELET # BLD AUTO: 140 10(3)UL (ref 150–450)
PLATELET # BLD AUTO: 164 10(3)UL (ref 150–450)
PLATELET # BLD AUTO: 197 10(3)UL (ref 150–450)
PLATELET # BLD AUTO: 213 10(3)UL (ref 150–450)
PLATELET # BLD AUTO: 22 10(3)UL (ref 150–450)
PLATELET # BLD AUTO: 243 10(3)UL (ref 150–450)
PLATELET # BLD AUTO: 244 10(3)UL (ref 150–450)
PLATELET # BLD AUTO: 290 10(3)UL (ref 150–450)
PLATELET # BLD AUTO: 355 10(3)UL (ref 150–450)
PLATELET # BLD AUTO: 38 10(3)UL (ref 150–450)
PLATELET # BLD AUTO: 389 10(3)UL (ref 150–450)
PLATELET # BLD AUTO: 41 10(3)UL (ref 150–450)
PLATELET # BLD AUTO: 452 10(3)UL (ref 150–450)
PLATELET # BLD AUTO: 460 10(3)UL (ref 150–450)
PLATELET # BLD AUTO: 66 10(3)UL (ref 150–450)
PLATELET MORPHOLOGY: NORMAL
POTASSIUM SERPL-SCNC: 3.1 MMOL/L (ref 3.5–5.1)
POTASSIUM SERPL-SCNC: 3.2 MMOL/L (ref 3.5–5.1)
POTASSIUM SERPL-SCNC: 3.3 MMOL/L (ref 3.5–5.1)
POTASSIUM SERPL-SCNC: 3.4 MMOL/L (ref 3.5–5.1)
POTASSIUM SERPL-SCNC: 3.5 MMOL/L (ref 3.5–5.1)
POTASSIUM SERPL-SCNC: 3.5 MMOL/L (ref 3.5–5.1)
POTASSIUM SERPL-SCNC: 3.6 MMOL/L (ref 3.5–5.1)
POTASSIUM SERPL-SCNC: 3.6 MMOL/L (ref 3.5–5.1)
POTASSIUM SERPL-SCNC: 3.7 MMOL/L (ref 3.5–5.1)
POTASSIUM SERPL-SCNC: 3.7 MMOL/L (ref 3.5–5.1)
POTASSIUM SERPL-SCNC: 4 MMOL/L (ref 3.5–5.1)
POTASSIUM SERPL-SCNC: 4.3 MMOL/L (ref 3.5–5.1)
POTASSIUM SERPL-SCNC: 4.4 MMOL/L (ref 3.5–5.1)
POTASSIUM SERPL-SCNC: 4.5 MMOL/L (ref 3.5–5.1)
POTASSIUM SERPL-SCNC: 4.9 MMOL/L (ref 3.5–5.1)
RBC # BLD AUTO: 1.99 X10(6)UL (ref 3.8–5.3)
RBC # BLD AUTO: 2.32 X10(6)UL (ref 3.8–5.3)
RBC # BLD AUTO: 2.37 X10(6)UL (ref 3.8–5.3)
RBC # BLD AUTO: 2.37 X10(6)UL (ref 3.8–5.3)
RBC # BLD AUTO: 2.45 X10(6)UL (ref 3.8–5.3)
RBC # BLD AUTO: 2.49 X10(6)UL (ref 3.8–5.3)
RBC # BLD AUTO: 2.56 X10(6)UL (ref 3.8–5.3)
RBC # BLD AUTO: 2.59 X10(6)UL (ref 3.8–5.3)
RBC # BLD AUTO: 2.62 X10(6)UL (ref 3.8–5.3)
RBC # BLD AUTO: 2.65 X10(6)UL (ref 3.8–5.3)
RBC # BLD AUTO: 2.71 X10(6)UL (ref 3.8–5.3)
RBC # BLD AUTO: 2.73 X10(6)UL (ref 3.8–5.3)
RBC # BLD AUTO: 2.78 X10(6)UL (ref 3.8–5.3)
RBC # BLD AUTO: 2.79 X10(6)UL (ref 3.8–5.3)
RBC # BLD AUTO: 2.82 X10(6)UL (ref 3.8–5.3)
RBC # BLD AUTO: 2.83 X10(6)UL (ref 3.8–5.3)
RBC # BLD AUTO: 2.97 X10(6)UL (ref 3.8–5.3)
RETICS # AUTO: 126.7 X10(3) UL (ref 22.5–147.5)
RETICS # AUTO: 86.3 X10(3) UL (ref 22.5–147.5)
RETICS/RBC NFR AUTO: 3.6 % (ref 0.5–2.5)
RETICS/RBC NFR AUTO: 4.5 % (ref 0.5–2.5)
RH BLOOD TYPE: POSITIVE
SED RATE-ML: 98 MM/HR (ref 0–25)
SODIUM SERPL-SCNC: 132 MMOL/L (ref 136–145)
SODIUM SERPL-SCNC: 133 MMOL/L (ref 136–145)
SODIUM SERPL-SCNC: 134 MMOL/L (ref 136–145)
SODIUM SERPL-SCNC: 135 MMOL/L (ref 136–145)
SODIUM SERPL-SCNC: 135 MMOL/L (ref 136–145)
SODIUM SERPL-SCNC: 136 MMOL/L (ref 136–145)
SODIUM SERPL-SCNC: 137 MMOL/L (ref 136–145)
SODIUM SERPL-SCNC: 137 MMOL/L (ref 136–145)
T3 SERPL-MCNC: 60 NG/DL (ref 60–181)
T4 FREE SERPL-MCNC: 0.9 NG/DL (ref 0.8–1.7)
T4 FREE SERPL-MCNC: 1.1 NG/DL (ref 0.8–1.7)
T4 FREE SERPL-MCNC: 1.4 NG/DL (ref 0.8–1.7)
T4 FREE SERPL-MCNC: 1.4 NG/DL (ref 0.8–1.7)
TOTAL CELLS COUNTED: 100
TOTAL IRON BINDING CAPACITY: 209 UG/DL (ref 240–450)
TOTAL IRON BINDING CAPACITY: 210 UG/DL (ref 240–450)
TRANSFERRIN SERPL-MCNC: 140 MG/DL (ref 200–360)
TRANSFERRIN SERPL-MCNC: 141 MG/DL (ref 200–360)
TSI SER-ACNC: 0.24 MIU/ML (ref 0.36–3.74)
TSI SER-ACNC: 0.45 MIU/ML (ref 0.36–3.74)
TSI SER-ACNC: 0.46 MIU/ML (ref 0.36–3.74)
TSI SER-ACNC: 0.74 MIU/ML (ref 0.36–3.74)
VIT B12 SERPL-MCNC: 1623 PG/ML (ref 193–986)
WBC # BLD AUTO: 0.6 X10(3) UL (ref 4–11)
WBC # BLD AUTO: 1.2 X10(3) UL (ref 4–11)
WBC # BLD AUTO: 1.2 X10(3) UL (ref 4–11)
WBC # BLD AUTO: 1.7 X10(3) UL (ref 4–11)
WBC # BLD AUTO: 1.8 X10(3) UL (ref 4–11)
WBC # BLD AUTO: 11.2 X10(3) UL (ref 4–11)
WBC # BLD AUTO: 11.8 X10(3) UL (ref 4–11)
WBC # BLD AUTO: 13.1 X10(3) UL (ref 4–11)
WBC # BLD AUTO: 17.5 X10(3) UL (ref 4–11)
WBC # BLD AUTO: 20.7 X10(3) UL (ref 4–11)
WBC # BLD AUTO: 3.7 X10(3) UL (ref 4–11)
WBC # BLD AUTO: 6 X10(3) UL (ref 4–11)
WBC # BLD AUTO: 6.8 X10(3) UL (ref 4–11)
WBC # BLD AUTO: 6.9 X10(3) UL (ref 4–11)
WBC # BLD AUTO: 7.2 X10(3) UL (ref 4–11)
WBC # BLD AUTO: 8.7 X10(3) UL (ref 4–11)
WBC # BLD AUTO: 9.8 X10(3) UL (ref 4–11)

## 2019-01-01 PROCEDURE — 96375 TX/PRO/DX INJ NEW DRUG ADDON: CPT

## 2019-01-01 PROCEDURE — 85025 COMPLETE CBC W/AUTO DIFF WBC: CPT

## 2019-01-01 PROCEDURE — 96367 TX/PROPH/DG ADDL SEQ IV INF: CPT

## 2019-01-01 PROCEDURE — 83735 ASSAY OF MAGNESIUM: CPT

## 2019-01-01 PROCEDURE — A9575 INJ GADOTERATE MEGLUMI 0.1ML: HCPCS | Performed by: INTERNAL MEDICINE

## 2019-01-01 PROCEDURE — 99153 MOD SED SAME PHYS/QHP EA: CPT | Performed by: INTERNAL MEDICINE

## 2019-01-01 PROCEDURE — 36561 INSERT TUNNELED CV CATH: CPT

## 2019-01-01 PROCEDURE — 86900 BLOOD TYPING SEROLOGIC ABO: CPT

## 2019-01-01 PROCEDURE — 71275 CT ANGIOGRAPHY CHEST: CPT | Performed by: EMERGENCY MEDICINE

## 2019-01-01 PROCEDURE — 81235 EGFR GENE COM VARIANTS: CPT | Performed by: INTERNAL MEDICINE

## 2019-01-01 PROCEDURE — 96366 THER/PROPH/DIAG IV INF ADDON: CPT

## 2019-01-01 PROCEDURE — 80053 COMPREHEN METABOLIC PANEL: CPT

## 2019-01-01 PROCEDURE — 84443 ASSAY THYROID STIM HORMONE: CPT

## 2019-01-01 PROCEDURE — 36415 COLL VENOUS BLD VENIPUNCTURE: CPT

## 2019-01-01 PROCEDURE — 82378 CARCINOEMBRYONIC ANTIGEN: CPT

## 2019-01-01 PROCEDURE — 86901 BLOOD TYPING SEROLOGIC RH(D): CPT

## 2019-01-01 PROCEDURE — 96365 THER/PROPH/DIAG IV INF INIT: CPT

## 2019-01-01 PROCEDURE — 87070 CULTURE OTHR SPECIMN AEROBIC: CPT | Performed by: INTERNAL MEDICINE

## 2019-01-01 PROCEDURE — 99215 OFFICE O/P EST HI 40 MIN: CPT | Performed by: NURSE PRACTITIONER

## 2019-01-01 PROCEDURE — 83615 LACTATE (LD) (LDH) ENZYME: CPT | Performed by: INTERNAL MEDICINE

## 2019-01-01 PROCEDURE — 82728 ASSAY OF FERRITIN: CPT

## 2019-01-01 PROCEDURE — 96411 CHEMO IV PUSH ADDL DRUG: CPT

## 2019-01-01 PROCEDURE — 83540 ASSAY OF IRON: CPT

## 2019-01-01 PROCEDURE — 99152 MOD SED SAME PHYS/QHP 5/>YRS: CPT

## 2019-01-01 PROCEDURE — 86850 RBC ANTIBODY SCREEN: CPT

## 2019-01-01 PROCEDURE — 99213 OFFICE O/P EST LOW 20 MIN: CPT | Performed by: NURSE PRACTITIONER

## 2019-01-01 PROCEDURE — 96413 CHEMO IV INFUSION 1 HR: CPT

## 2019-01-01 PROCEDURE — 96360 HYDRATION IV INFUSION INIT: CPT

## 2019-01-01 PROCEDURE — 5A09357 ASSISTANCE WITH RESPIRATORY VENTILATION, LESS THAN 24 CONSECUTIVE HOURS, CONTINUOUS POSITIVE AIRWAY PRESSURE: ICD-10-PCS | Performed by: INTERNAL MEDICINE

## 2019-01-01 PROCEDURE — 32555 ASPIRATE PLEURA W/ IMAGING: CPT | Performed by: INTERNAL MEDICINE

## 2019-01-01 PROCEDURE — 96361 HYDRATE IV INFUSION ADD-ON: CPT

## 2019-01-01 PROCEDURE — G9678 ONCOLOGY CARE MODEL SERVICE: HCPCS | Performed by: INTERNAL MEDICINE

## 2019-01-01 PROCEDURE — 88108 CYTOPATH CONCENTRATE TECH: CPT | Performed by: INTERNAL MEDICINE

## 2019-01-01 PROCEDURE — 96372 THER/PROPH/DIAG INJ SC/IM: CPT

## 2019-01-01 PROCEDURE — 99215 OFFICE O/P EST HI 40 MIN: CPT | Performed by: INTERNAL MEDICINE

## 2019-01-01 PROCEDURE — 82962 GLUCOSE BLOOD TEST: CPT

## 2019-01-01 PROCEDURE — 99213 OFFICE O/P EST LOW 20 MIN: CPT | Performed by: FAMILY MEDICINE

## 2019-01-01 PROCEDURE — 99205 OFFICE O/P NEW HI 60 MIN: CPT | Performed by: INTERNAL MEDICINE

## 2019-01-01 PROCEDURE — 88360 TUMOR IMMUNOHISTOCHEM/MANUAL: CPT | Performed by: INTERNAL MEDICINE

## 2019-01-01 PROCEDURE — 85007 BL SMEAR W/DIFF WBC COUNT: CPT

## 2019-01-01 PROCEDURE — 10005 FNA BX W/US GDN 1ST LES: CPT | Performed by: INTERNAL MEDICINE

## 2019-01-01 PROCEDURE — 36430 TRANSFUSION BLD/BLD COMPNT: CPT

## 2019-01-01 PROCEDURE — 80048 BASIC METABOLIC PNL TOTAL CA: CPT | Performed by: NURSE PRACTITIONER

## 2019-01-01 PROCEDURE — 88342 IMHCHEM/IMCYTCHM 1ST ANTB: CPT | Performed by: INTERNAL MEDICINE

## 2019-01-01 PROCEDURE — 82465 ASSAY BLD/SERUM CHOLESTEROL: CPT | Performed by: INTERNAL MEDICINE

## 2019-01-01 PROCEDURE — 84480 ASSAY TRIIODOTHYRONINE (T3): CPT

## 2019-01-01 PROCEDURE — 96376 TX/PRO/DX INJ SAME DRUG ADON: CPT

## 2019-01-01 PROCEDURE — 84439 ASSAY OF FREE THYROXINE: CPT

## 2019-01-01 PROCEDURE — 99215 OFFICE O/P EST HI 40 MIN: CPT | Performed by: CLINICAL NURSE SPECIALIST

## 2019-01-01 PROCEDURE — 70553 MRI BRAIN STEM W/O & W/DYE: CPT | Performed by: INTERNAL MEDICINE

## 2019-01-01 PROCEDURE — 88341 IMHCHEM/IMCYTCHM EA ADD ANTB: CPT | Performed by: INTERNAL MEDICINE

## 2019-01-01 PROCEDURE — 36593 DECLOT VASCULAR DEVICE: CPT

## 2019-01-01 PROCEDURE — 88173 CYTOPATH EVAL FNA REPORT: CPT | Performed by: INTERNAL MEDICINE

## 2019-01-01 PROCEDURE — 0JH60WZ INSERTION OF TOTALLY IMPLANTABLE VASCULAR ACCESS DEVICE INTO CHEST SUBCUTANEOUS TISSUE AND FASCIA, OPEN APPROACH: ICD-10-PCS | Performed by: RADIOLOGY

## 2019-01-01 PROCEDURE — 74177 CT ABD & PELVIS W/CONTRAST: CPT | Performed by: INTERNAL MEDICINE

## 2019-01-01 PROCEDURE — 85027 COMPLETE CBC AUTOMATED: CPT

## 2019-01-01 PROCEDURE — 99152 MOD SED SAME PHYS/QHP 5/>YRS: CPT | Performed by: INTERNAL MEDICINE

## 2019-01-01 PROCEDURE — 80048 BASIC METABOLIC PNL TOTAL CA: CPT

## 2019-01-01 PROCEDURE — 84311 SPECTROPHOTOMETRY: CPT | Performed by: INTERNAL MEDICINE

## 2019-01-01 PROCEDURE — 77001 FLUOROGUIDE FOR VEIN DEVICE: CPT

## 2019-01-01 PROCEDURE — 86920 COMPATIBILITY TEST SPIN: CPT

## 2019-01-01 PROCEDURE — 99214 OFFICE O/P EST MOD 30 MIN: CPT | Performed by: NURSE PRACTITIONER

## 2019-01-01 PROCEDURE — 89051 BODY FLUID CELL COUNT: CPT | Performed by: INTERNAL MEDICINE

## 2019-01-01 PROCEDURE — 84157 ASSAY OF PROTEIN OTHER: CPT | Performed by: INTERNAL MEDICINE

## 2019-01-01 PROCEDURE — 99214 OFFICE O/P EST MOD 30 MIN: CPT | Performed by: FAMILY MEDICINE

## 2019-01-01 PROCEDURE — 80076 HEPATIC FUNCTION PANEL: CPT

## 2019-01-01 PROCEDURE — 71046 X-RAY EXAM CHEST 2 VIEWS: CPT | Performed by: FAMILY MEDICINE

## 2019-01-01 PROCEDURE — 87205 SMEAR GRAM STAIN: CPT | Performed by: INTERNAL MEDICINE

## 2019-01-01 PROCEDURE — 83615 LACTATE (LD) (LDH) ENZYME: CPT

## 2019-01-01 PROCEDURE — 82945 GLUCOSE OTHER FLUID: CPT | Performed by: INTERNAL MEDICINE

## 2019-01-01 PROCEDURE — 0WPB30Z REMOVAL OF DRAINAGE DEVICE FROM LEFT PLEURAL CAVITY, PERCUTANEOUS APPROACH: ICD-10-PCS | Performed by: INTERNAL MEDICINE

## 2019-01-01 PROCEDURE — 30233N1 TRANSFUSION OF NONAUTOLOGOUS RED BLOOD CELLS INTO PERIPHERAL VEIN, PERCUTANEOUS APPROACH: ICD-10-PCS | Performed by: INTERNAL MEDICINE

## 2019-01-01 PROCEDURE — 85018 HEMOGLOBIN: CPT

## 2019-01-01 PROCEDURE — 85610 PROTHROMBIN TIME: CPT

## 2019-01-01 PROCEDURE — 89050 BODY FLUID CELL COUNT: CPT | Performed by: INTERNAL MEDICINE

## 2019-01-01 PROCEDURE — 82746 ASSAY OF FOLIC ACID SERUM: CPT

## 2019-01-01 PROCEDURE — 74177 CT ABD & PELVIS W/CONTRAST: CPT | Performed by: EMERGENCY MEDICINE

## 2019-01-01 PROCEDURE — 71275 CT ANGIOGRAPHY CHEST: CPT | Performed by: NURSE PRACTITIONER

## 2019-01-01 PROCEDURE — 96417 CHEMO IV INFUS EACH ADDL SEQ: CPT

## 2019-01-01 PROCEDURE — 71046 X-RAY EXAM CHEST 2 VIEWS: CPT | Performed by: NURSE PRACTITIONER

## 2019-01-01 PROCEDURE — 88381 MICRODISSECTION MANUAL: CPT | Performed by: INTERNAL MEDICINE

## 2019-01-01 PROCEDURE — 87040 BLOOD CULTURE FOR BACTERIA: CPT

## 2019-01-01 PROCEDURE — 83550 IRON BINDING TEST: CPT

## 2019-01-01 PROCEDURE — 85045 AUTOMATED RETICULOCYTE COUNT: CPT

## 2019-01-01 PROCEDURE — 36591 DRAW BLOOD OFF VENOUS DEVICE: CPT

## 2019-01-01 PROCEDURE — 93306 TTE W/DOPPLER COMPLETE: CPT | Performed by: EMERGENCY MEDICINE

## 2019-01-01 PROCEDURE — 78815 PET IMAGE W/CT SKULL-THIGH: CPT | Performed by: INTERNAL MEDICINE

## 2019-01-01 PROCEDURE — 71045 X-RAY EXAM CHEST 1 VIEW: CPT | Performed by: INTERNAL MEDICINE

## 2019-01-01 PROCEDURE — 71046 X-RAY EXAM CHEST 2 VIEWS: CPT | Performed by: INTERNAL MEDICINE

## 2019-01-01 PROCEDURE — 71260 CT THORAX DX C+: CPT | Performed by: INTERNAL MEDICINE

## 2019-01-01 PROCEDURE — 82800 BLOOD PH: CPT | Performed by: INTERNAL MEDICINE

## 2019-01-01 PROCEDURE — 81210 BRAF GENE: CPT | Performed by: INTERNAL MEDICINE

## 2019-01-01 PROCEDURE — 02HV33Z INSERTION OF INFUSION DEVICE INTO SUPERIOR VENA CAVA, PERCUTANEOUS APPROACH: ICD-10-PCS | Performed by: RADIOLOGY

## 2019-01-01 PROCEDURE — 76937 US GUIDE VASCULAR ACCESS: CPT

## 2019-01-01 PROCEDURE — 82607 VITAMIN B-12: CPT

## 2019-01-01 PROCEDURE — 82042 OTHER SOURCE ALBUMIN QUAN EA: CPT | Performed by: INTERNAL MEDICINE

## 2019-01-01 PROCEDURE — 0W9B30Z DRAINAGE OF LEFT PLEURAL CAVITY WITH DRAINAGE DEVICE, PERCUTANEOUS APPROACH: ICD-10-PCS | Performed by: INTERNAL MEDICINE

## 2019-01-01 PROCEDURE — 99238 HOSP IP/OBS DSCHRG MGMT 30/<: CPT | Performed by: INTERNAL MEDICINE

## 2019-01-01 PROCEDURE — 85014 HEMATOCRIT: CPT | Performed by: INTERNAL MEDICINE

## 2019-01-01 PROCEDURE — 85652 RBC SED RATE AUTOMATED: CPT

## 2019-01-01 PROCEDURE — 96374 THER/PROPH/DIAG INJ IV PUSH: CPT

## 2019-01-01 PROCEDURE — 88172 CYTP DX EVAL FNA 1ST EA SITE: CPT | Performed by: INTERNAL MEDICINE

## 2019-01-01 PROCEDURE — 76998 US GUIDE INTRAOP: CPT | Performed by: INTERNAL MEDICINE

## 2019-01-01 PROCEDURE — 88305 TISSUE EXAM BY PATHOLOGIST: CPT | Performed by: INTERNAL MEDICINE

## 2019-01-01 PROCEDURE — 84478 ASSAY OF TRIGLYCERIDES: CPT | Performed by: INTERNAL MEDICINE

## 2019-01-01 PROCEDURE — 99153 MOD SED SAME PHYS/QHP EA: CPT

## 2019-01-01 PROCEDURE — 71045 X-RAY EXAM CHEST 1 VIEW: CPT | Performed by: EMERGENCY MEDICINE

## 2019-01-01 RX ORDER — DIPHENHYDRAMINE HCL 25 MG
25 CAPSULE ORAL ONCE
Status: COMPLETED | OUTPATIENT
Start: 2019-01-01 | End: 2019-01-01

## 2019-01-01 RX ORDER — ACETAMINOPHEN 325 MG/1
650 TABLET ORAL ONCE
Status: COMPLETED | OUTPATIENT
Start: 2019-01-01 | End: 2019-01-01

## 2019-01-01 RX ORDER — SODIUM CHLORIDE 0.9 % (FLUSH) 0.9 %
10 SYRINGE (ML) INJECTION ONCE
Status: CANCELLED | OUTPATIENT
Start: 2019-01-01

## 2019-01-01 RX ORDER — ONDANSETRON 2 MG/ML
8 INJECTION INTRAMUSCULAR; INTRAVENOUS ONCE
Status: CANCELLED
Start: 2019-01-01

## 2019-01-01 RX ORDER — DIPHENOXYLATE HYDROCHLORIDE AND ATROPINE SULFATE 2.5; .025 MG/1; MG/1
1-2 TABLET ORAL 4 TIMES DAILY PRN
Qty: 60 TABLET | Refills: 0 | Status: SHIPPED | OUTPATIENT
Start: 2019-01-01

## 2019-01-01 RX ORDER — MIDAZOLAM HYDROCHLORIDE 1 MG/ML
INJECTION INTRAMUSCULAR; INTRAVENOUS
Status: COMPLETED
Start: 2019-01-01 | End: 2019-01-01

## 2019-01-01 RX ORDER — SODIUM CHLORIDE 0.9 % (FLUSH) 0.9 %
10 SYRINGE (ML) INJECTION ONCE
Status: COMPLETED | OUTPATIENT
Start: 2019-01-01 | End: 2019-01-01

## 2019-01-01 RX ORDER — HEPARIN SODIUM AND DEXTROSE 10000; 5 [USP'U]/100ML; G/100ML
18 INJECTION INTRAVENOUS ONCE
Status: COMPLETED | OUTPATIENT
Start: 2019-01-01 | End: 2019-01-01

## 2019-01-01 RX ORDER — MIDAZOLAM HYDROCHLORIDE 1 MG/ML
INJECTION INTRAMUSCULAR; INTRAVENOUS
Status: DISCONTINUED | OUTPATIENT
Start: 2019-01-01 | End: 2019-01-01

## 2019-01-01 RX ORDER — ONDANSETRON HYDROCHLORIDE 8 MG/1
8 TABLET, FILM COATED ORAL EVERY 8 HOURS PRN
Qty: 30 TABLET | Refills: 3 | Status: SHIPPED | OUTPATIENT
Start: 2019-01-01

## 2019-01-01 RX ORDER — POTASSIUM CHLORIDE 1500 MG/1
TABLET, EXTENDED RELEASE ORAL
Qty: 180 TABLET | Refills: 0 | Status: SHIPPED | OUTPATIENT
Start: 2019-01-01

## 2019-01-01 RX ORDER — LIDOCAINE HYDROCHLORIDE 10 MG/ML
INJECTION, SOLUTION INFILTRATION; PERINEURAL
Status: DISCONTINUED | OUTPATIENT
Start: 2019-01-01 | End: 2019-01-01

## 2019-01-01 RX ORDER — ONDANSETRON 2 MG/ML
8 INJECTION INTRAMUSCULAR; INTRAVENOUS ONCE
Status: DISCONTINUED | OUTPATIENT
Start: 2019-01-01 | End: 2019-01-01

## 2019-01-01 RX ORDER — ONDANSETRON HYDROCHLORIDE 8 MG/1
8 TABLET, FILM COATED ORAL EVERY 8 HOURS PRN
Qty: 30 TABLET | Refills: 3 | Status: CANCELLED | OUTPATIENT
Start: 2019-01-01

## 2019-01-01 RX ORDER — DRONABINOL 2.5 MG/1
2.5 CAPSULE ORAL
Qty: 60 CAPSULE | Refills: 0 | Status: SHIPPED | OUTPATIENT
Start: 2019-01-01 | End: 2019-01-01

## 2019-01-01 RX ORDER — FOLIC ACID 1 MG/1
1 TABLET ORAL DAILY
Qty: 90 TABLET | Refills: 3 | Status: CANCELLED | OUTPATIENT
Start: 2019-01-01

## 2019-01-01 RX ORDER — CYANOCOBALAMIN 1000 UG/ML
1000 INJECTION INTRAMUSCULAR; SUBCUTANEOUS ONCE
Status: COMPLETED | OUTPATIENT
Start: 2019-01-01 | End: 2019-01-01

## 2019-01-01 RX ORDER — BACITRACIN 50000 [USP'U]/1
INJECTION, POWDER, LYOPHILIZED, FOR SOLUTION INTRAMUSCULAR
Status: COMPLETED
Start: 2019-01-01 | End: 2019-01-01

## 2019-01-01 RX ORDER — DRONABINOL 5 MG/1
5 CAPSULE ORAL
Qty: 60 CAPSULE | Refills: 3 | Status: SHIPPED | OUTPATIENT
Start: 2019-01-01 | End: 2019-01-01

## 2019-01-01 RX ORDER — DEXAMETHASONE 4 MG/1
4 TABLET ORAL 2 TIMES DAILY
Qty: 18 TABLET | Refills: 6 | Status: SHIPPED | OUTPATIENT
Start: 2019-01-01 | End: 2019-01-01

## 2019-01-01 RX ORDER — ESCITALOPRAM OXALATE 10 MG/1
10 TABLET ORAL DAILY
Qty: 30 TABLET | Refills: 11 | Status: SHIPPED | OUTPATIENT
Start: 2019-01-01 | End: 2019-01-01

## 2019-01-01 RX ORDER — MORPHINE SULFATE 4 MG/ML
2 INJECTION, SOLUTION INTRAMUSCULAR; INTRAVENOUS ONCE
Status: DISCONTINUED | OUTPATIENT
Start: 2019-01-01 | End: 2019-10-24

## 2019-01-01 RX ORDER — CYANOCOBALAMIN 1000 UG/ML
1000 INJECTION INTRAMUSCULAR; SUBCUTANEOUS ONCE
Status: CANCELLED | OUTPATIENT
Start: 2019-01-01

## 2019-01-01 RX ORDER — FLUCONAZOLE 100 MG/1
100 TABLET ORAL DAILY
Qty: 7 TABLET | Refills: 0 | Status: SHIPPED | OUTPATIENT
Start: 2019-01-01 | End: 2019-01-01 | Stop reason: ALTCHOICE

## 2019-01-01 RX ORDER — SODIUM CHLORIDE 9 MG/ML
INJECTION, SOLUTION INTRAVENOUS CONTINUOUS
Status: DISCONTINUED | OUTPATIENT
Start: 2019-01-01 | End: 2019-01-01

## 2019-01-01 RX ORDER — LISINOPRIL AND HYDROCHLOROTHIAZIDE 20; 12.5 MG/1; MG/1
1 TABLET ORAL DAILY
Qty: 90 TABLET | Refills: 0 | Status: SHIPPED | OUTPATIENT
Start: 2019-01-01 | End: 2019-01-01

## 2019-01-01 RX ORDER — POTASSIUM CHLORIDE 750 MG/1
20 TABLET, EXTENDED RELEASE ORAL ONCE
Status: CANCELLED
Start: 2019-01-01

## 2019-01-01 RX ORDER — LEVOFLOXACIN 500 MG/1
500 TABLET, FILM COATED ORAL DAILY
Qty: 10 TABLET | Refills: 0 | Status: SHIPPED | OUTPATIENT
Start: 2019-01-01 | End: 2019-01-01 | Stop reason: ALTCHOICE

## 2019-01-01 RX ORDER — FLUOXETINE HYDROCHLORIDE 20 MG/1
20 CAPSULE ORAL DAILY
Qty: 30 CAPSULE | Refills: 1 | Status: SHIPPED | OUTPATIENT
Start: 2019-01-01

## 2019-01-01 RX ORDER — DEXAMETHASONE 4 MG/1
4 TABLET ORAL 2 TIMES DAILY
Qty: 18 TABLET | Refills: 6 | Status: CANCELLED | OUTPATIENT
Start: 2019-01-01

## 2019-01-01 RX ORDER — HEPARIN SODIUM 5000 [USP'U]/ML
INJECTION, SOLUTION INTRAVENOUS; SUBCUTANEOUS
Status: COMPLETED
Start: 2019-01-01 | End: 2019-01-01

## 2019-01-01 RX ORDER — POTASSIUM CHLORIDE 1500 MG/1
20 TABLET, FILM COATED, EXTENDED RELEASE ORAL 2 TIMES DAILY
Qty: 60 TABLET | Refills: 3 | Status: SHIPPED | OUTPATIENT
Start: 2019-01-01 | End: 2019-01-01

## 2019-01-01 RX ORDER — PROCHLORPERAZINE MALEATE 10 MG
10 TABLET ORAL EVERY 6 HOURS PRN
Qty: 30 TABLET | Refills: 3 | Status: SHIPPED | OUTPATIENT
Start: 2019-01-01

## 2019-01-01 RX ORDER — HEPARIN SODIUM 5000 [USP'U]/ML
80 INJECTION INTRAVENOUS; SUBCUTANEOUS ONCE
Status: COMPLETED | OUTPATIENT
Start: 2019-01-01 | End: 2019-01-01

## 2019-01-01 RX ORDER — MORPHINE SULFATE 4 MG/ML
INJECTION, SOLUTION INTRAMUSCULAR; INTRAVENOUS
Status: COMPLETED
Start: 2019-01-01 | End: 2019-01-01

## 2019-01-01 RX ORDER — CLARITHROMYCIN 250 MG/1
250 TABLET, FILM COATED ORAL 2 TIMES DAILY
Qty: 20 TABLET | Refills: 0 | Status: SHIPPED | OUTPATIENT
Start: 2019-01-01 | End: 2019-01-01

## 2019-01-01 RX ORDER — ACETAMINOPHEN 325 MG/1
650 TABLET ORAL ONCE
Status: CANCELLED | OUTPATIENT
Start: 2019-01-01

## 2019-01-01 RX ORDER — PRAVASTATIN SODIUM 20 MG
TABLET ORAL
Qty: 90 TABLET | Refills: 1 | Status: SHIPPED | OUTPATIENT
Start: 2019-01-01

## 2019-01-01 RX ORDER — CHLORHEXIDINE GLUCONATE 4 G/100ML
30 SOLUTION TOPICAL ONCE
Status: COMPLETED | OUTPATIENT
Start: 2019-01-01 | End: 2019-01-01

## 2019-01-01 RX ORDER — FOLIC ACID 1 MG/1
1 TABLET ORAL DAILY
Qty: 90 TABLET | Refills: 3 | Status: SHIPPED | OUTPATIENT
Start: 2019-01-01

## 2019-01-01 RX ORDER — DIPHENHYDRAMINE HCL 25 MG
25 CAPSULE ORAL ONCE
Status: CANCELLED | OUTPATIENT
Start: 2019-01-01

## 2019-01-01 RX ORDER — POTASSIUM CHLORIDE 20 MEQ/1
20 TABLET, EXTENDED RELEASE ORAL ONCE
Status: COMPLETED | OUTPATIENT
Start: 2019-01-01 | End: 2019-01-01

## 2019-01-01 RX ORDER — ONDANSETRON 2 MG/ML
4 INJECTION INTRAMUSCULAR; INTRAVENOUS ONCE AS NEEDED
Status: DISCONTINUED | OUTPATIENT
Start: 2019-01-01 | End: 2019-01-01

## 2019-01-01 RX ORDER — PROCHLORPERAZINE MALEATE 10 MG
10 TABLET ORAL EVERY 6 HOURS PRN
Qty: 30 TABLET | Refills: 3 | Status: CANCELLED | OUTPATIENT
Start: 2019-01-01

## 2019-01-01 RX ORDER — ALBUTEROL SULFATE 90 UG/1
2 AEROSOL, METERED RESPIRATORY (INHALATION) EVERY 6 HOURS PRN
COMMUNITY

## 2019-01-01 RX ORDER — LOPERAMIDE HYDROCHLORIDE 2 MG/1
4 CAPSULE ORAL ONCE
Status: COMPLETED | OUTPATIENT
Start: 2019-01-01 | End: 2019-01-01

## 2019-01-01 RX ORDER — DEXAMETHASONE 4 MG/1
4 TABLET ORAL 2 TIMES DAILY
Qty: 18 TABLET | Refills: 6 | Status: SHIPPED | OUTPATIENT
Start: 2019-01-01

## 2019-01-01 RX ORDER — AZITHROMYCIN 250 MG/1
TABLET, FILM COATED ORAL
Status: ON HOLD | COMMUNITY
Start: 2019-01-01 | End: 2019-01-01

## 2019-01-01 RX ORDER — HEPARIN SODIUM AND DEXTROSE 10000; 5 [USP'U]/100ML; G/100ML
INJECTION INTRAVENOUS CONTINUOUS
Status: DISCONTINUED | OUTPATIENT
Start: 2019-01-01 | End: 2019-10-24

## 2019-01-01 RX ORDER — CLINDAMYCIN PHOSPHATE 150 MG/ML
INJECTION, SOLUTION INTRAVENOUS
Status: COMPLETED
Start: 2019-01-01 | End: 2019-01-01

## 2019-01-01 RX ORDER — SODIUM CHLORIDE, SODIUM LACTATE, POTASSIUM CHLORIDE, CALCIUM CHLORIDE 600; 310; 30; 20 MG/100ML; MG/100ML; MG/100ML; MG/100ML
INJECTION, SOLUTION INTRAVENOUS CONTINUOUS
Status: DISCONTINUED | OUTPATIENT
Start: 2019-01-01 | End: 2019-01-01

## 2019-01-01 RX ORDER — SODIUM CHLORIDE 0.9 % (FLUSH) 0.9 %
10 SYRINGE (ML) INJECTION ONCE
Status: DISCONTINUED | OUTPATIENT
Start: 2019-01-01 | End: 2019-01-01

## 2019-01-01 RX ORDER — LIDOCAINE HYDROCHLORIDE 10 MG/ML
INJECTION, SOLUTION INFILTRATION; PERINEURAL
Status: COMPLETED
Start: 2019-01-01 | End: 2019-01-01

## 2019-01-01 RX ORDER — PREDNISONE 20 MG/1
TABLET ORAL
Qty: 30 TABLET | Refills: 0 | Status: SHIPPED | OUTPATIENT
Start: 2019-01-01 | End: 2019-01-01 | Stop reason: ALTCHOICE

## 2019-01-01 RX ADMIN — SODIUM CHLORIDE 0.9 % (FLUSH) 10 ML: 0.9 % SYRINGE (ML) INJECTION at 13:20:00

## 2019-01-01 RX ADMIN — SODIUM CHLORIDE 500 ML: 9 INJECTION, SOLUTION INTRAVENOUS at 09:12:00

## 2019-01-01 RX ADMIN — SODIUM CHLORIDE 0.9 % (FLUSH) 10 ML: 0.9 % SYRINGE (ML) INJECTION at 13:15:00

## 2019-01-01 RX ADMIN — SODIUM CHLORIDE 0.9 % (FLUSH) 10 ML: 0.9 % SYRINGE (ML) INJECTION at 10:39:00

## 2019-01-01 RX ADMIN — SODIUM CHLORIDE 0.9 % (FLUSH) 10 ML: 0.9 % SYRINGE (ML) INJECTION at 12:05:00

## 2019-01-01 RX ADMIN — SODIUM CHLORIDE 0.9 % (FLUSH) 10 ML: 0.9 % SYRINGE (ML) INJECTION at 15:25:00

## 2019-01-01 RX ADMIN — SODIUM CHLORIDE 0.9 % (FLUSH) 10 ML: 0.9 % SYRINGE (ML) INJECTION at 13:30:00

## 2019-01-01 RX ADMIN — SODIUM CHLORIDE 0.9 % (FLUSH) 10 ML: 0.9 % SYRINGE (ML) INJECTION at 14:00:00

## 2019-01-01 RX ADMIN — CHLORHEXIDINE GLUCONATE 30 ML: 4 SOLUTION TOPICAL at 09:10:00

## 2019-01-01 RX ADMIN — CYANOCOBALAMIN 1000 MCG: 1000 INJECTION INTRAMUSCULAR; SUBCUTANEOUS at 16:15:00

## 2019-01-01 RX ADMIN — SODIUM CHLORIDE 0.9 % (FLUSH) 10 ML: 0.9 % SYRINGE (ML) INJECTION at 15:24:00

## 2019-01-01 RX ADMIN — ACETAMINOPHEN 650 MG: 325 TABLET ORAL at 11:58:00

## 2019-01-01 RX ADMIN — LOPERAMIDE HYDROCHLORIDE 4 MG: 2 CAPSULE ORAL at 09:16:00

## 2019-01-01 RX ADMIN — POTASSIUM CHLORIDE 20 MEQ: 20 TABLET, EXTENDED RELEASE ORAL at 10:17:00

## 2019-01-01 RX ADMIN — DIPHENHYDRAMINE HCL 25 MG: 25 MG CAPSULE ORAL at 14:43:00

## 2019-01-01 RX ADMIN — SODIUM CHLORIDE 0.9 % (FLUSH) 10 ML: 0.9 % SYRINGE (ML) INJECTION at 14:25:00

## 2019-01-01 RX ADMIN — SODIUM CHLORIDE 0.9 % (FLUSH) 10 ML: 0.9 % SYRINGE (ML) INJECTION at 12:55:00

## 2019-01-01 RX ADMIN — SODIUM CHLORIDE 0.9 % (FLUSH) 10 ML: 0.9 % SYRINGE (ML) INJECTION at 10:30:00

## 2019-01-01 RX ADMIN — DIPHENHYDRAMINE HCL 25 MG: 25 MG CAPSULE ORAL at 11:58:00

## 2019-01-01 RX ADMIN — CYANOCOBALAMIN 1000 MCG: 1000 INJECTION INTRAMUSCULAR; SUBCUTANEOUS at 12:26:00

## 2019-01-01 RX ADMIN — SODIUM CHLORIDE 0.9 % (FLUSH) 10 ML: 0.9 % SYRINGE (ML) INJECTION at 14:35:00

## 2019-01-01 RX ADMIN — SODIUM CHLORIDE 0.9 % (FLUSH) 10 ML: 0.9 % SYRINGE (ML) INJECTION at 15:47:00

## 2019-01-01 RX ADMIN — SODIUM CHLORIDE 0.9 % (FLUSH) 10 ML: 0.9 % SYRINGE (ML) INJECTION at 17:02:00

## 2019-01-01 RX ADMIN — SODIUM CHLORIDE 0.9 % (FLUSH) 10 ML: 0.9 % SYRINGE (ML) INJECTION at 16:05:00

## 2019-01-01 RX ADMIN — CYANOCOBALAMIN 1000 MCG: 1000 INJECTION INTRAMUSCULAR; SUBCUTANEOUS at 13:18:00

## 2019-01-01 RX ADMIN — SODIUM CHLORIDE 0.9 % (FLUSH) 10 ML: 0.9 % SYRINGE (ML) INJECTION at 13:05:00

## 2019-01-01 RX ADMIN — ACETAMINOPHEN 650 MG: 325 TABLET ORAL at 14:30:00

## 2019-01-01 RX ADMIN — SODIUM CHLORIDE 0.9 % (FLUSH) 10 ML: 0.9 % SYRINGE (ML) INJECTION at 14:15:00

## 2019-03-25 NOTE — PROGRESS NOTES
Ailin Bowman is a 80year old female.   Patient presents with:  Shortness Of Breath: W/ ACTIVITY---- RM 6    Subjective   HPI:   States she had a cold or sinus a while ago    Since then  No more issues  She now noted dyspnea when climbing stairs or walkin any unusual skin lesions or rashes  RESPIRATORY: denies shortness of breath with exertion  CARDIOVASCULAR: denies chest pain on exertion  GI: denies abdominal pain and denies heartburn  NEURO: denies headaches     Objective   EXAM:   /80   Pulse 90 for update and possible new imaging.           Meds & Refills for this Visit:  Requested Prescriptions     Signed Prescriptions Disp Refills   • predniSONE 20 MG Oral Tab 30 tablet 0     Sig: 3 tabs daily 5 days 2 tabs daily 5 days 1 tab daily 5 days   • le

## 2019-04-01 NOTE — TELEPHONE ENCOUNTER
NIA WAS SUPPOSE TO CALL AND LET  KNOW, HER PNEUMONIA IS DOING BETTER BUT NOT GONE, SHE IS WONDERING IF SHE SHOULD KEEP TAKING MEDS?

## 2019-04-01 NOTE — TELEPHONE ENCOUNTER
Patient states that she has noticed improvement with breathing when walking up stairs. Pt states she does not feel 100% but it has improved. Pt states that she has 3 days left of the abx and 2 more weeks of the steroids.  Pt is wanting to know when she shou

## 2019-04-02 NOTE — TELEPHONE ENCOUNTER
BMP ordered: left detailed message for pt on identified vm in regards to need for test. Office number provided for call back

## 2019-04-02 NOTE — TELEPHONE ENCOUNTER
CT with and without ordered  Patient is requesting to go to [address] faxed and pt provided number to call and schedule

## 2019-04-02 NOTE — TELEPHONE ENCOUNTER
FOR LABS: CHECK GFR LEVELS/CREATINE OR CMP OR A BMP. NEED ONE OF THE 3.    [place] [phone]

## 2019-04-09 NOTE — TELEPHONE ENCOUNTER
Spoke with Val Persaud who states radiologist is requesting chest CT should with contrast only    Dr. Caron Bolaños advised and agreed    Val Persaud requests a new order be placed and faxed to [phone]

## 2019-04-09 NOTE — TELEPHONE ENCOUNTER
Phone call at 12:06 PM 4/9/2019    Informed of the results of the scan showing   7.5 cm tumor in the left lung with mediastinal mass    report below    Discussed she will need biopsy for tissue, and then chemo, surgery radiation treatments as indicated per approximately 1.8 x 1.6 cm in size. There is a small area of consolidation involving the superior aspect of the left lower lobe as well which may represent atelectasis versus tumor.  There is subtle interstitial thickening in the aerated portions of the

## 2019-04-09 NOTE — TELEPHONE ENCOUNTER
Spoke with pt who states that she is only available M, Tues, or W.    Called and scheduled pt with Dr. Ziyad Chisholm at 1300 on 4/22/2019    Pt advised and Dr. Ziyad Chisholm office number provided

## 2019-04-12 PROBLEM — R91.8 MASS OF UPPER LOBE OF LEFT LUNG: Status: ACTIVE | Noted: 2019-01-01

## 2019-04-12 NOTE — TELEPHONE ENCOUNTER
PT FEELS CLAMMY, EXHAUSTED, THROAT IS HOARSE, ACHES ALL OVER. PT WANTS TO DISCUSS WITH NURSE ABOUT HER OPTIONS.  PLEASE CALL

## 2019-04-12 NOTE — PROGRESS NOTES
Jian Mares is a 80year old female. Patient presents with:  Fatigue: dry throat started yesterday. room 6.      Subjective   HPI:   Here for evaluation  States she has mild diaphoresis    She feels fatigued    Also finished levaquin and prednisone rece SKIN: denies any unusual skin lesions or rashes  RESPIRATORY: mild cough  ENT sore throat  CARDIOVASCULAR: denies chest pain on exertion  GI: denies abdominal pain and denies heartburn  NEURO: denies headaches     Objective   EXAM:   /60   Temp 97. clarithromycin 250 MG Oral Tab 20 tablet 0     Sig: Take 1 tablet (250 mg total) by mouth 2 (two) times daily for 10 days. • fluconazole 100 MG Oral Tab 7 tablet 0     Sig: Take 1 tablet (100 mg total) by mouth daily for 7 doses.            Liana Santos

## 2019-04-12 NOTE — TELEPHONE ENCOUNTER
Contacted patient, notified Dr. Myke Martinez has openings today, appt scheduled.      Future Appointments   Date Time Provider Silvio Corry   4/12/2019 11:30 AM Naldo Shah MD [facility]   4/22/2019  1:00 PM Trinh Kellogg MD [facility] HEM ONC Denisha Diana

## 2019-04-22 NOTE — PROGRESS NOTES
Education Record    Learner:  Patient    Disease / Luciana Orly mass    Barriers / Limitations:  None   Comments:    Method:  Brief focused   Comments:    General Topics:  Plan of care reviewed   Comments:    Outcome:  Shows understanding   Comments:pt h

## 2019-04-22 NOTE — TELEPHONE ENCOUNTER
Pt states that Dr. Agusto Molina was not able to see the CT imagines. Advised that  has the imagines and she would need to contact . Offered to have imagine inter-office-mailed to Dr. Dereje Comer if she wanted to drop off the disc.  Pt states that she has another

## 2019-04-22 NOTE — PROGRESS NOTES
Hematology/Oncology Initial Consultation Note    Patient Name: Kedar Welch Record Number: [id number]    YOB: 1950   Date of Consultation: 4/22/2019   PCP: Dr. Stefan Beach   Other Providers: Dr. Jenny Lindsay (cardiology)    Reason f ongoing dyspnea on exertion, though somewhat less since received the course of steroids. Still with a nonproductive cough and voice hoarseness. No throat pain or mouth soreness. She can walk over a block, but not much further.   Has some ongoing rhinorrhea status: Former Smoker        Packs/day: 1.00        Years: 30.00        Pack years: 30        Types: Cigarettes        Start date: 1971        Quit date: 2001        Years since quittin.1      Smokeless tobacco: Never Used    Alcohol use: No 04/24/2018    .0 04/10/2018     Lab Results   Component Value Date     (H) 04/10/2018    BUN 34 (H) 04/10/2018    CREATSERUM 1.46 (H) 04/10/2018    CREATSERUM 1.04 (H) 01/22/2018    CREATSERUM 1.09 (H) 09/12/2017    GFR 56 (L) 01/22/2018    G

## 2019-04-26 NOTE — TELEPHONE ENCOUNTER
Pt called that she was scheduling her CT guided biopsy and the earliest she could schedule is May 7th and MD wanted her to call if could not be done next week. Called Kimberli Crawford to see if could be scheduled earlier. Offered May 1st 1300. Called [facility].

## 2019-04-26 NOTE — TELEPHONE ENCOUNTER
PET/CT scan reviewed with Dr. Gerard Jiménez. Shows uptake in bilateral supraclavicular LNs in addition to primary left mediastinal/lung mass as well as mediastinal and hilar nodes. Ordered a CT guided biopsy of supraclavicular LN to obtain diagnosis.   I augustin

## 2019-05-01 NOTE — PROCEDURES
PET pos rt supra clavicular LN. 25G FNA x 4. Dr. Daina Jain present. Sampling felt adequate. Comp-none.

## 2019-05-01 NOTE — PROCEDURES
Spirometry and  flow volume loop show evidence of  obstruction. .    There was no change in spirometry after bronchodilator challenge.       Normal TLC, no evidence of restrictionm  The diffusing capacity is  Moderately  decreased,  but corrects into the n

## 2019-05-03 NOTE — TELEPHONE ENCOUNTER
Preliminary results from supraclavicular LN bx consistent with NSCLC, adenocarcinoma. It is questionable whether there will be sufficient tissue for the lung mutation panel testing. She is scheduled for a thoracentesis of the pleural fluid on Monday.   Cur

## 2019-05-06 NOTE — OPERATIVE REPORT
[address] Patient Status:  Outpatient    1950 MRN [id number]   Location [address] [place] [address] Attending Pepper Jules MD   [facility] Day # 0 PCP Diann Valles MD       Date of Procedure: 19      Pre-operative Diagnosi revealed no residual pleural effusion and the presence of sliding lung. The patient tolerated the procedure well and without any complications.       Condition:  Stable     Ricki Silverio MD  [facility] [place]

## 2019-05-09 PROBLEM — N18.9 CKD (CHRONIC KIDNEY DISEASE): Status: ACTIVE | Noted: 2019-01-01

## 2019-05-09 PROBLEM — J91.0 MALIGNANT PLEURAL EFFUSION: Status: ACTIVE | Noted: 2019-01-01

## 2019-05-09 PROBLEM — C34.92 STAGE IV ADENOCARCINOMA OF LUNG, LEFT (HCC): Status: ACTIVE | Noted: 2019-01-01

## 2019-05-09 NOTE — PROGRESS NOTES
Outpatient Oncology Care Plan  Problem list:  knowledge deficit    Problems related to:    chemotherapy  disease/disease progression    Interventions:  provided general teaching    Expected outcomes:  understands plan of care    Progress towards outcome:

## 2019-05-09 NOTE — PROGRESS NOTES
Hematology/Oncology Clinic Follow Up Visit    Patient Name: Kedar Welch Record Number: [id number]    YOB: 1950    PCP: Dr. Robyn Dial   Other Providers: Dr. Corby Silva (cardiology)    Reason for Consultation:  Eugene Hager was thoracentesis- cytology is positive for metastatic adenocarcinoma compatible with lung primary.    ===========================================  Interval events: Presents for follow-up.   Additional work-up as above shows that she has stage IV NSCLC based on one tablet by mouth. Disp:  Rfl:    aspirin 81 MG Oral Tab Take 81 mg by mouth daily.  Disp:  Rfl:    Pravastatin Sodium 20 MG Oral Tab TAKE 1 TABLET BY MOUTH IN THE EVENING Disp: 90 tablet Rfl: 1   Lisinopril-hydroCHLOROthiazide 20-12.5 MG Oral Tab Take 1 edema  Respiratory: diminished at left base, otherwise lungs clear to auscultation bilaterally  GI/Abd: Soft, non-tender with normoactive bowel sounds, no hepatosplenomegaly  Neurological: Grossly intact   Lymphatics: +bilateral supraclavicular lymphadenop uncontrolled cancer growth. She would like to proceed with treatment.   -lung mutation profile is pending. Additionally she will enroll in the Netlist trial with testing.   If her testing shows a targetable  mutation will plan to treat this, otherw

## 2019-05-09 NOTE — PROGRESS NOTES
STUDY: Tow[facility] - [facility]  ID # [id number]    Consent note    Met with patient following MD consult today. Patient diagnosed with metastatic lung cancer.   discussed Nadya Flicker and Veristrat testing performed on clinical trial. Explained htat resul

## 2019-05-09 NOTE — PROGRESS NOTES
IV Chemotherapy Education    Patient:Sharyn Boothe      Date: 5/9/19   Diagnosis:  Metastatic lung cancer   Caregivers present: Children    Drug names:  Keytruda, Carboplatin, Alimta    Treatment Effects on Bone Marrow:  Chemotherapy action on cancer / no muscle weakness, low red blood cell (Anemia)      Infusion Reactions: Chills, shaking, shortness of breath or wheezing, itching or rash, flushing, dizziness, fever, feeling like passing out          Teaching Materials Provided:      [facility] Chemotherapy

## 2019-05-13 NOTE — TELEPHONE ENCOUNTER
Pt called M that she went and picked up medications that APN told her to take at chemo education. Pt was asking about steroid it was not at her pharmacy. Pt states she was at her pulmonary doctors office and her BP was low.  Top number was 100 pt asking w

## 2019-05-15 NOTE — PROGRESS NOTES
S/p PAC insertion, right chest, R IJ R chest soft c/d/i, denies pain 0/10 on pain scale. Pt tolerating PO intake without complaints. Pt hypotensive, sys BP low 80's, patient asymptomatic. IVF bolus given, responded well. Pt now in 589'G systolic.  Jonh Brasher

## 2019-05-16 PROBLEM — R79.89 LOW TSH LEVEL: Status: ACTIVE | Noted: 2019-01-01

## 2019-05-16 PROBLEM — C79.31 BRAIN METASTASIS: Status: ACTIVE | Noted: 2019-01-01

## 2019-05-16 PROBLEM — C79.31 BRAIN METASTASIS (HCC): Status: ACTIVE | Noted: 2019-01-01

## 2019-05-16 PROBLEM — R91.8 MASS OF UPPER LOBE OF LEFT LUNG: Status: RESOLVED | Noted: 2019-01-01 | Resolved: 2019-01-01

## 2019-05-16 NOTE — PROGRESS NOTES
Outpatient Oncology Care Plan  Problem list:  loss of appetite  fatigue  knowledge deficit    Problems related to:    chemotherapy  disease/disease progression  side effect of treatment    Interventions:  emotional support given  provided general teaching

## 2019-05-16 NOTE — PATIENT INSTRUCTIONS
Anti-nausea medication    Zofran: can take every 8 hours as needed for nausea  Compazine: can take every 6 hours as needed for nausea    Last dose of Zofran was given at 12pm today (Thursday, 5/16). To alternate:  Take one of each every 4 hours.  For ex

## 2019-05-16 NOTE — PROGRESS NOTES
Pt here for C1D1 Keytruda/Alimta/Carbo for NSCLC. Arrives Ambulating independently, accompanied by Family member           Modifications in dose or schedule: Yes -- alimta and carbo dose-reduced. alimta 300mg/m2 and carbo AUC down to 5.  This is due to cre

## 2019-05-16 NOTE — PROGRESS NOTES
Hematology/Oncology Clinic Follow Up Visit    Patient Name: Kedar Welch Record Number: [id number]    YOB: 1950    PCP: Dr. Hector Holt   Other Providers: Dr. Krish Meadows (cardiology)    Reason for Consultation:  Deshawn Vega was thoracentesis- cytology is positive for metastatic adenocarcinoma compatible with lung primary. Lung mutation panel showed no  mutations involving EGFR, ALK, ROS1, BRAF 600.   PDL1 TPS <1%.   -5/9/19- MRI brain- single 4mm metastasis within the mid le Medications:    dexamethasone (DECADRON) 4 MG tablet Take 1 tablet (4 mg total) by mouth 2 (two) times daily. Take 4mg twice daily the day before chemo, the day of chemo, & the day after chemo.  Disp: 18 tablet Rfl: 6   umeclidinium-vilanterol (ANORO ELLIPT Review of Systems:  A 10-point ROS was done with pertinent positives and negative per the HPI    Vital Signs:  Height: 166 cm (5' 5.35\") (05/16 0957)  Weight: 101.7 kg (224 lb 3.2 oz) (05/16 0957)  BSA (Calculated - sq m): 2.08 sq meters (05/16 0957) 05/16/2019    OSMOCALC 295 05/16/2019    ALKPHO 79 05/16/2019    AST 8 (L) 05/16/2019    ALT 9 (L) 05/16/2019    BILT 0.4 05/16/2019    TP 7.8 05/16/2019    ALB 3.2 (L) 05/16/2019    GLOBULIN 4.6 (H) 05/16/2019     (L) 05/16/2019    K 4.7 05/16/2019 related to poor fluid intake. Encouraged to increase fluid intake further. Give 1 L IVF prior to dose reduced alimta today. Discussed that if CreCl remains <45 there is increased risk for toxicity associated with alimta use.    -repeat CMP in 1 week    *

## 2019-05-20 NOTE — TELEPHONE ENCOUNTER
Date of Treatment: 5/16/19                                 Chemo: Pembro/Carbo/Alimta    Comments: Spoke with patient. She is feeling more tired today, felt OK over the weekend. She is eating OK. Clear, productive cough, denies fevers.       Recommendations

## 2019-05-21 NOTE — PROGRESS NOTES
Nutrition screen complete as triggered by Best Practice dx of stage IV lung cancer. Chart reviewed. Pt appears at a mild to moderate nutrition risk at present. RD will arrange to meet w/ pt during her chemo infusion.

## 2019-05-22 PROBLEM — R11.0 NAUSEA: Status: ACTIVE | Noted: 2019-01-01

## 2019-05-22 NOTE — PROGRESS NOTES
Hematology/Oncology Clinic Follow Up Visit    Patient Name: Kedar Welch Record Number: [id number]    YOB: 1950    PCP: Dr. Kearney Guardian   Other Providers: Dr. Philip Flores (cardiology)    Reason for Consultation:  Marvin Metcalf was thoracentesis- cytology is positive for metastatic adenocarcinoma compatible with lung primary. Lung mutation panel showed no  mutations involving EGFR, ALK, ROS1, BRAF 600. PDL1 TPS <1%.  Genestrat from blood confirmed no RET fusions or KRAS mutatio Right 4/24/2018    Performed by Viviane Adkins MD at [facility] MAIN OR   • KNEE TOTAL REPLACEMENT Left 2/13/2018    Performed by Viviane Adkins MD at [facility] MAIN OR   • TONSILLECTOMY      2nd grade     Home Medications:    dexamethasone (DECADRON) 4 MG tablet Take old age   • Renal Disease Mother    • Cancer Sister 79        CNS lymphoma     Review of Systems:  A 10-point ROS was done with pertinent positives and negative per the HPI    Vital Signs:  Height: 166 cm (5' 5.35\") (05/22 1004)  Weight: 99.9 kg (2 05/16/2019    CREATSERUM 1.50 (H) 04/22/2019    ANIONGAP 7 05/22/2019    GFR 56 (L) 01/22/2018    GFRNAA 53 (L) 05/22/2019    GFRAA 61 05/22/2019    CA 9.2 05/22/2019    OSMOCALC 276 05/22/2019    ALKPHO 71 05/22/2019    AST 13 (L) 05/22/2019    ALT 10 (L) today, monitor for response to treatment    *normocytic anemia  -stable, will check iron studies Repeat CBC in 2 weeks    *CKD  -may be in part related to poor fluid intake.   Strangely is better today despite her reporting she has been having a harder time

## 2019-05-22 NOTE — PROGRESS NOTES
Outpatient Oncology Care Plan  Problem list:  loss of appetite  fatigue  knowledge deficit  nausea and vomiting    Problems related to:    disease/disease progression  side effect of treatment    Interventions:  emotional support given  nausea/vomiting tea

## 2019-05-22 NOTE — PROGRESS NOTES
Education Record    Learner:  Patient and Family Member    Disease / Diagnosis:     Barriers / Limitations:  None   Comments:    Method:  Brief focused   Comments:    General Topics:  Plan of care reviewed   Comments:    Outcome:  Shows understanding   Com

## 2019-05-24 PROBLEM — T45.1X5A CHEMOTHERAPY INDUCED NAUSEA AND VOMITING: Status: ACTIVE | Noted: 2019-01-01

## 2019-05-24 PROBLEM — R11.2 CHEMOTHERAPY INDUCED NAUSEA AND VOMITING: Status: ACTIVE | Noted: 2019-01-01

## 2019-05-24 PROBLEM — R63.0 POOR APPETITE: Status: ACTIVE | Noted: 2019-01-01

## 2019-05-24 PROBLEM — K59.09 OTHER CONSTIPATION: Status: ACTIVE | Noted: 2019-01-01

## 2019-05-29 PROBLEM — Z51.5 PALLIATIVE CARE BY SPECIALIST: Status: ACTIVE | Noted: 2019-01-01

## 2019-05-29 NOTE — PROGRESS NOTES
Education Record    Learner:  Patient and Friend    Disease / Diagnosis: f/u for n/v-possible ivf    Barriers / Limitations:  None   Comments:    Method:  Brief focused   Comments:    General Topics:  Plan of care reviewed   Comments:    Outcome:  Shows un

## 2019-05-29 NOTE — PROGRESS NOTES
Reviewed chest xray with Dr. Navin Dalal and chest conference. Patient may benefit from pleurx catheter insertion to help with dyspnea symptoms. Discussed thoracentesis and pleurx catheter options with patient and sister via phone.     She is agreeable to fo Arthritis, degenerative 5/11/2015   • Coronary atherosclerosis    • Essential hypertension 9/21/2015   • Hyperlipidemia 5/11/2015   • Malignant pleural effusion 5/9/2019   • MI (myocardial infarction) ([facility] [address] 08/2000    s/p stent placement   • Obesity (BMI 3 MG tablet Take 1 tablet (4 mg total) by mouth 2 (two) times daily. Take 4mg twice daily the day before chemo, the day of chemo, & the day after chemo.  Disp: 18 tablet Rfl: 6   umeclidinium-vilanterol (ANORO ELLIPTA) 62.5-25 MCG/INH Inhalation Aerosol Powde nausea is much better controlled with regular use of zofran and compazine. Discussed trial of ensure supplements if she is concerned that she isn't eating enough. She is drinking more now. Discussed limiting nap duration and frequency.   She is really af

## 2019-06-05 PROBLEM — D64.81 ANEMIA DUE TO CHEMOTHERAPY: Status: ACTIVE | Noted: 2019-01-01

## 2019-06-05 PROBLEM — E06.4 DRUG-INDUCED THYROIDITIS: Status: ACTIVE | Noted: 2019-01-01

## 2019-06-05 PROBLEM — T45.1X5A ANEMIA DUE TO CHEMOTHERAPY: Status: ACTIVE | Noted: 2019-01-01

## 2019-06-05 PROBLEM — R63.0 POOR APPETITE: Status: ACTIVE | Noted: 2019-01-01

## 2019-06-05 PROBLEM — R79.89 LOW TSH LEVEL: Status: RESOLVED | Noted: 2019-01-01 | Resolved: 2019-01-01

## 2019-06-05 PROBLEM — R63.0 POOR APPETITE: Status: RESOLVED | Noted: 2019-01-01 | Resolved: 2019-01-01

## 2019-06-05 NOTE — PROGRESS NOTES
Hematology/Oncology Clinic Follow Up Visit    Patient Name: Kedar Welch Record Number: [id number]    YOB: 1950    PCP: Dr. Jose Luis Cardenas   Other Providers: Dr. Niesha Belle (cardiology)    Reason for Consultation:  Grecia Harris was thoracentesis- cytology is positive for metastatic adenocarcinoma compatible with lung primary. Lung mutation panel showed no  mutations involving EGFR, ALK, ROS1, BRAF 600. PDL1 TPS <1%.  Genestrat from blood confirmed no RET fusions or KRAS mutatio Surgical History:   Procedure Laterality Date   • ARTHROSCOPY OF JOINT UNLISTED Left 2001   • CATH PERCUTANEOUS  TRANSLUMINAL CORONARY ANGIOPLASTY  2000    x1   • KNEE REPLACEMENT SURGERY Left 02/13/2018   • KNEE TOTAL REPLACEMENT Right 4/24/2018    Perfor Quit date: 2001        Years since quittin.3      Smokeless tobacco: Never Used    Alcohol use: No    Drug use: No    Family Medical History:  Family History   Problem Relation Age of Onset   • Other (Other) Father         old age   • Renal Date     (H) 06/05/2019    BUN 15 06/05/2019    BUNCREA 12.3 06/05/2019    CREATSERUM 1.22 (H) 06/05/2019    CREATSERUM 1.05 (H) 05/29/2019    CREATSERUM 1.08 (H) 05/22/2019    ANIONGAP 5 06/05/2019    GFR 56 (L) 01/22/2018    GFRNAA 46 (L) 06/05/20 sufficient CNS penetration, anticipate response to treatment. Reserve RT as option if progresses.    -repeat MRI brain after 2 cycles of treatment- order placed     *malignant left pleural effusion  -s/p thoracentesis, but no improvement in dyspnea  -clini

## 2019-06-05 NOTE — PROGRESS NOTES
Outpatient Oncology Care Plan  Problem list:  loss of appetite  respiratory  fatigue  knowledge deficit    Problems related to:    disease/disease progression  side effect of treatment    Interventions:  emotional support given  provided nutrition support

## 2019-06-05 NOTE — PROGRESS NOTES
Pt here for C2D1 keytruda/alimta/carbo.   Arrives Ambulating independently, accompanied by Friend           Modifications in dose or schedule: No     Frequency of blood return and site check throughout administration: Prior to administration, Prior to each

## 2019-06-06 NOTE — TELEPHONE ENCOUNTER
Crow Lara MD  P Edw Paulino Valadez Rns             Please call pt and advise that I spoke with Dr. Tova Lovett and he is in agreement with our plan to hold off on a repeat thoracentesis at this time and reassess with CT scan in 3 weeks so long as her oliver

## 2019-06-07 NOTE — PROGRESS NOTES
Nutrition Consultation    Patient Name: Yue Rae  YOB: 1950  Medical Record Number: [id number]   Account Number: [de-identified]  Dietitian: Samantha Li RD, LDN    Date of visit: 6/5/2019    Diet Rx: high protein/quality as tolerated BY MOUTH IN THE EVENING, Disp: 90 tablet, Rfl: 1    Pertinent Labs:     Height: 5'5.35\"          IBW: 125 +/- 10%    WT HX:   06/05/19 : 97.9 kg (215 lb 12.8 oz)  05/29/19 : 98 kg (216 lb)  05/22/19 : 99.9 kg (220 lb 3.2 oz)  05/16/19 : 101.7 kg (224 lb 3

## 2019-06-11 NOTE — TELEPHONE ENCOUNTER
Pt called M that she thinks she has thrush. Her tongue is coated in white and her throat is thick feeling as well. Per MD send nystatin to pharmacy. Pt notified. Instructed to call if not improving.

## 2019-06-17 NOTE — TELEPHONE ENCOUNTER
MD Leesa Pugh, RN   Caller: Unspecified (Today, 11:03 AM)             Encourage her to take her temperature and call if any fevers.  Try to stay active, at least go for a couple short walks each day.  If worsens to call back and we wou

## 2019-06-17 NOTE — TELEPHONE ENCOUNTER
Pt called LVM having question she was told by MD that she would have a rough 5 days after chemo and then be ok. She had a really good 4 days after chemo and ever since has not been wanting to do anything. She wants to lay on the couch all day.  Pt states sh

## 2019-06-26 NOTE — PROGRESS NOTES
Hematology/Oncology Clinic Follow Up Visit    Patient Name: Kedar Welch Record Number: [id number]    YOB: 1950    PCP: Dr. Kearney Guardian   Other Providers: Dr. Philip Flores (cardiology)    Reason for Consultation:  Marvin Metcalf was thoracentesis- cytology is positive for metastatic adenocarcinoma compatible with lung primary. Lung mutation panel showed no  mutations involving EGFR, ALK, ROS1, BRAF 600. PDL1 TPS <1%.  Genestrat from blood confirmed no RET fusions or KRAS mutatio 9/21/2015   • Hyperlipidemia 5/11/2015   • Malignant pleural effusion 5/9/2019   • MI (myocardial infarction) ([facility] [address] 08/2000    s/p stent placement   • Obesity (BMI 30-39. 9) 5/11/2015   • PONV (postoperative nausea and vomiting)    • Stage IV adenocarcinoma History Narrative      . Has 3 adult children, 6 grandchildren, all live locally. She lives with her oldest daughter. Works as a hairdresser.        Social History    Tobacco Use      Smoking status: Former Smoker        Packs/day: 1.00        Ruthy Zaragoza HGB 7.2 (L) 06/26/2019    HGB 9.3 (L) 06/05/2019    HGB 11.0 (L) 05/22/2019    HCT 23.4 (L) 06/26/2019    MCV 85.7 06/26/2019    MCH 26.4 06/26/2019    MCHC 30.8 (L) 06/26/2019    RDW 18.1 (H) 06/26/2019    .0 (H) 06/26/2019    .0 (H) 06/05/2 -Pembrolizumab 200mg IV D1  -vitamin L33 and folic acid supplementation per protocol  -monitor CBC, CMP, and periodic TFTs.   Will follow CEA as this appears it may be a useful tumor marker  -repeat CT c/a/p after cycle 4  -will plan a larger NGS mutatio today. Has seen nutrition. Palliative care following. To start marinol, medical MJ    *fatigue due to treatment, deconditioning  -advised to stay physically active. Start PT.   Repeat CBC next week and transfuse prn.    *constipation  -better- drinks slu

## 2019-06-26 NOTE — PROGRESS NOTES
Pt here for C3.   Arrives Ambulating independently, accompanied by Family member           Modifications in dose or schedule: No     Frequency of blood return and site check throughout administration: Prior to administration and At completion of therapy   D

## 2019-06-26 NOTE — PROGRESS NOTES
Outpatient Oncology Care Plan  Problem list:  loss of appetite  respiratory  fatigue  knowledge deficit    Problems related to:    disease/disease progression  side effect of treatment    Interventions:  emotional support given  optimize nutrition status

## 2019-06-27 NOTE — PROGRESS NOTES
Palliative Care Follow Up Note     Patient Name: Falguni Tim   YOB: 1950   Medical Record Number: [id number]   CSN: [id number]   Date of visit: 6/27/2019     Chief Complaint/Reason for Visit:  Patient presents with:   Follow - Up     History REPLACEMENT Right 4/24/2018    Performed by Lisy Lynn MD at [facility] MAIN OR   • KNEE TOTAL REPLACEMENT Left 2/13/2018    Performed by Lisy Lynn MD at [facility] MAIN OR   • TONSILLECTOMY      2nd grade       Allergies:    Pcn [Penicillins]       SWELLING for Nausea. Disp: 30 tablet Rfl: 3   Ondansetron HCl (ZOFRAN) 8 MG tablet Take 1 tablet (8 mg total) by mouth every 8 (eight) hours as needed for Nausea. Disp: 30 tablet Rfl: 3   folic acid 1 MG Oral Tab Take 1 tablet (1 mg total) by mouth daily.  Disp: 90 Delmer Lynn Outpatient Palliative Nurse Practitioner

## 2019-07-01 NOTE — PROGRESS NOTES
Pt meeting definition for SEVERE MALNUTRITION in the context of chronic illness as evidenced by 8% unplanned wt loss x 6 wks and po intake meeting less than 50% estimated nutrition needs.      NUTRITION F/U NOTE:     Patient Name: Abril Gramajo  Date of Bi (1 mg total) by mouth daily. , Disp: 90 tablet, Rfl: 3  •  aspirin 81 MG Oral Tab, Take 81 mg by mouth daily. , Disp: , Rfl:   •  Pravastatin Sodium 20 MG Oral Tab, TAKE 1 TABLET BY MOUTH IN THE EVENING, Disp: 90 tablet, Rfl: 1     Pertinent Labs:      Sravanthi

## 2019-07-01 NOTE — PROGRESS NOTES
Lab results reviewed with antonina estrada and orders received to give 20meq kcl as well as one unit prbc's. Patient to return on 7-5-19 for repeat labs and call antonina for further orders. Patient states understanding.      Education Record    Learner:  Pa

## 2019-07-01 NOTE — PROGRESS NOTES
Stage IV adenocarcinoma of LFT lung. Patient presents to clinic for labs / possible transfusion and visit with the APN. Patient c/o of shakiness for the last couples of days. Denies any nausea, vomiting, or diarrhea.

## 2019-07-01 NOTE — PROGRESS NOTES
ANP Visit Note    Patient Name: Pura Burgos   YOB: 1950   Medical Record Number: [id number]   CSN: [id number]   Date of visit: 7/1/2019     Chief Complaint/Reason for Visit:  Follow up / Metastatic NSCLC    History of Present Illness: Remigio 2000    x1   • KNEE REPLACEMENT SURGERY Left 02/13/2018   • KNEE TOTAL REPLACEMENT Right 4/24/2018    Performed by Pau Calabrese MD at [facility]   • KNEE TOTAL REPLACEMENT Left 2/13/2018    Performed by Pau Calabrese MD at [place] [id number] file        Physically abused: Not on file        Forced sexual activity: Not on file    Other Topics      Concerns:        Caffeine Concern: Not Asked        Exercise: Not Asked        Seat Belt: Not Asked        Special Diet: Not Asked        Stress Conc Examination:  General: Patient is alert and oriented x 3, not in acute distress.   Vital Signs:  Vitals History 7/1/2019   /83   BP Location Left arm   Patient Position Sitting   Cuff Size adult   Pulse 96   Resp 18   Temp 98.6   SpO2 95   Weight 203 Date: 07/01/2019  Value: 55.1*       Ref range: 35.1 - 46.3 fL     Status: Final  Neutrophil Absolute Prelim                    Date: 07/01/2019  Value: 5.11        Ref range: 1.50 - 7.70 x10 *  Status: Final  Neutrophil Absolute chemotherapy:  repeat CBC and transfuse for hgb <7 ,     Metastatic SLIM NSCLC, adenocarcinoma type: s/p cycle 3 carbo/alimta/keytruda 6/26/19     Renal Insufficiency : creatinine is elevated more than baseline related to decreased oral intake, fluids today

## 2019-07-05 NOTE — PROGRESS NOTES
BRIEF NUTRITION F/U NOTE:     Pt meeting definition for SEVERE MALNUTRITION in the context of chronic illness as evidenced by 8% unplanned wt loss x 6 wks and po intake meeting less than 50% estimated nutrition needs.      NUTRITION F/U NOTE:      Patient 3  •  folic acid 1 MG Oral Tab, Take 1 tablet (1 mg total) by mouth daily. , Disp: 90 tablet, Rfl: 3  •  aspirin 81 MG Oral Tab, Take 81 mg by mouth daily. , Disp: , Rfl:   •  Pravastatin Sodium 20 MG Oral Tab, TAKE 1 TABLET BY MOUTH IN THE EVENING, Disp: 90

## 2019-07-05 NOTE — PROGRESS NOTES
Cancer Center Progress Note    Patient Name: Falguni Tim   YOB: 1950   Medical Record Number: [id number]   CSN: [id number]   Date of visit: 7/5/2019   Provider: YUNG Isaacs  Referring Physician: Kali Novak    Problem List: total) by mouth 2 (two) times daily.  Take 4mg twice daily the day before chemo, the day of chemo, & the day after chemo., Disp: 18 tablet, Rfl: 6  •  umeclidinium-vilanterol (ANORO ELLIPTA) 62.5-25 MCG/INH Inhalation Aerosol Powder, Breath Activated, Inhal after transfusion earlier this week. She will receive 1 unit PRBC. Fatigue:  Multifactorial - chemo, anemia, poor nutrition, dehydration. Optimize/supportive care.     Elevated creatinine / Dehydration:  Creatinine is improving to baseline but pt sta

## 2019-07-10 PROBLEM — T45.1X5A CHEMOTHERAPY INDUCED NEUTROPENIA: Status: ACTIVE | Noted: 2019-01-01

## 2019-07-10 PROBLEM — T45.1X5A CHEMOTHERAPY INDUCED NEUTROPENIA (HCC): Status: ACTIVE | Noted: 2019-01-01

## 2019-07-10 PROBLEM — D70.1 CHEMOTHERAPY INDUCED NEUTROPENIA: Status: ACTIVE | Noted: 2019-01-01

## 2019-07-10 PROBLEM — D70.1 CHEMOTHERAPY INDUCED NEUTROPENIA (HCC): Status: ACTIVE | Noted: 2019-01-01

## 2019-07-10 NOTE — PROGRESS NOTES
Education Record    Learner:  Patient, family member    Disease / Diagnosis:    Barriers / Limitations:  None   Comments:    Method:  Brief focused   Comments:    General Topics:  Plan of care reviewed   Comments:    Outcome:  Shows understanding   Comment

## 2019-07-10 NOTE — PATIENT INSTRUCTIONS
Afrin nasal spray each nostril twice a day for 3 days, then saline nasal spray four times a day to each nostril

## 2019-07-10 NOTE — PROGRESS NOTES
ANP Visit Note    Patient Name: Marvin Mtecalf   YOB: 1950   Medical Record Number: [id number]   CSN: [id number]   Date of visit: 7/10/2019       Chief Complaint/Reason for Visit:  Metastatic NSCLC    Oncologic History:  *Metastatic SLIM NSCLC a primary. Lung mutation panel showed no  mutations involving EGFR, ALK, ROS1, BRAF 600. PDL1 TPS <1%. Genestrat from blood confirmed no RET fusions or KRAS mutations and further confirmed EGFR, ALK, ROS1, BRAF mutations. Veristrat good.   -5/9/19- MR History:  Past Medical History:   Diagnosis Date   • Arthritis, degenerative 5/11/2015   • Coronary atherosclerosis    • Essential hypertension 9/21/2015   • Hyperlipidemia 5/11/2015   • Malignant pleural effusion 5/9/2019   • MI (myocardial infarction) (H Activity      Alcohol use: No      Drug use: No      Sexual activity: Not on file    Lifestyle      Physical activity:        Days per week: Not on file        Minutes per session: Not on file      Stress: Not on file    Relationships      Social connectio needed for Nausea., Disp: 30 tablet, Rfl: 3  •  Ondansetron HCl (ZOFRAN) 8 MG tablet, Take 1 tablet (8 mg total) by mouth every 8 (eight) hours as needed for Nausea., Disp: 30 tablet, Rfl: 3  •  folic acid 1 MG Oral Tab, Take 1 tablet (1 mg total) by mouth Non- 37 (L) >=60    GFR, -American 43 (L) >=60    AST 19 15 - 37 U/L    ALT 17 13 - 56 U/L    Alkaline Phosphatase 106 55 - 142 U/L    Bilirubin, Total 0.9 0.1 - 2.0 mg/dL    Total Protein 7.6 6.4 - 8.2 g/dL    Albumin 2.9 (L) 3.4 - stage 3: Creat stable   8. Hypokalemia: K stable today, will continue to monitor. Emotional Well Being:  I have assessed the patient's emotional well-being and any concerns about anxiety or depression.   We discussed issues of distress, coping difficu

## 2019-07-15 NOTE — TELEPHONE ENCOUNTER
Per MD pt needs to come in for labs, possible transfusion CXR. Pt not able to come today will change pt apt to tomorrow instead of Wednesday. Chuyita Granado for pt to see APN at 945 pt will arrive at 0930 pt v/u.

## 2019-07-15 NOTE — TELEPHONE ENCOUNTER
Pt called because she was told by MD to call if her breathing is worse. Pt states that it is worse she can only walk about 10 feet before she has to stop and rest to catch her breath. Pt states her breathing is fine when she is sitting or lying down.  Ángela Aparicio

## 2019-07-16 NOTE — PROGRESS NOTES
Patient presents to clinic for acute visit, labs, and possible transfusion. C/o of SOB and exertion. Dx with Lung CA. Last treatment 6/26/19 next treatment is due 7/18/19 Keytruda/Carbo/ Alimlzl.

## 2019-07-16 NOTE — PROGRESS NOTES
ANP Visit Note    Patient Name: Bren Rinaldi   YOB: 1950   Medical Record Number: [id number]   CSN: [id number]   Date of visit: 7/16/2019       Chief Complaint/Reason for Visit:  Follow up / Metastatic Lung Cancer     Oncologic History:  *Met compatible with lung primary. Lung mutation panel showed no  mutations involving EGFR, ALK, ROS1, BRAF 600. PDL1 TPS <1%. Genestrat from blood confirmed no RET fusions or KRAS mutations and further confirmed EGFR, ALK, ROS1, BRAF mutations.   Moraima Law History:   Diagnosis Date   • Arthritis, degenerative 5/11/2015   • Coronary atherosclerosis    • Essential hypertension 9/21/2015   • Hyperlipidemia 5/11/2015   • Malignant pleural effusion 5/9/2019   • [facility]    s/p sten use: No      Drug use: No      Sexual activity: Not on file    Lifestyle      Physical activity:        Days per week: Not on file        Minutes per session: Not on file      Stress: Not on file    Relationships      Social connections:        Talks on ph 30 tablet, Rfl: 3  •  Ondansetron HCl (ZOFRAN) 8 MG tablet, Take 1 tablet (8 mg total) by mouth every 8 (eight) hours as needed for Nausea., Disp: 30 tablet, Rfl: 3  •  folic acid 1 MG Oral Tab, Take 1 tablet (1 mg total) by mouth daily. , Disp: 90 tablet, 29.0        Ref range: 21.0 - 32.0 mmol*  Status: Final  Anion Gap                                     Date: 07/16/2019  Value: 7           Ref range: 0 - 18 mmol/L      Status: Final  BUN                                           Date: 07/16/2019  Value: 4.4 g/dL     Status: Final  A/G Ratio                                     Date: 07/16/2019  Value: 0.6*        Ref range: 1.0 - 2.0          Status: Final  WBC                                           Date: 07/16/2019  Value: 11.8*       Ref range: 4.0 - Ref range: 1.50 - 7.70 x10(*  Status: Final  Lymphocyte Absolute Manual                    Date: 07/16/2019  Value: 1.65        Ref range: 1.00 - 4.00 x10(*  Status: Final  Monocyte Absolute Manual                      Date: 07/16/2019  Value: 1.89* Status: Final  Total Cells Counted                           Date: 07/16/2019  Value: 100           Status: Final  RBC Morphology                                Date: 07/16/2019  Value: See morphology below                     Ref range: Normal, Slide re*

## 2019-07-17 PROBLEM — E83.42 HYPOMAGNESEMIA: Status: ACTIVE | Noted: 2019-01-01

## 2019-07-17 PROBLEM — E87.6 HYPOKALEMIA: Status: ACTIVE | Noted: 2019-01-01

## 2019-07-17 NOTE — PROGRESS NOTES
Education Record    Learner:  Patient    Disease / Diagnosis: Lung CA; hypokalemia and hypomagnesemia - electrolyte replacements     Barriers / Limitations:  None   Comments:    Method:  Brief focused and Reinforcement   Comments:    General Topics:  Plan

## 2019-07-18 PROBLEM — R43.2 DYSGEUSIA: Status: ACTIVE | Noted: 2019-01-01

## 2019-07-18 NOTE — OPERATIVE REPORT
[address] [facility] Patient Status:  Outpatient    1950 MRN [id number]   Location [address] [place] [address] Attending Roula Flynn MD   [facility] Day # 0 PCP Nas Del Rosario MD       Date of Procedure: 19      Pre-operative Diagnosi complications.       Condition:  Stable     Impression  Likely left trapped lung  Metastatic lung cancer  Malignant pleural effusion    Plan  Will assess symptom relief post thoracentesis and review in outpatient clinic regarding next steps    Sanjeev Sterling

## 2019-07-19 NOTE — PROGRESS NOTES
Hematology/Oncology Clinic Follow Up Visit    Patient Name: Kedar Welch Record Number: [id number]    YOB: 1950    PCP: Dr. Vera Blanco   Other Providers: Dr. Jose Hussein (cardiology)    Reason for Consultation:  Luna Fontana was thoracentesis- cytology is positive for metastatic adenocarcinoma compatible with lung primary. Lung mutation panel showed no  mutations involving EGFR, ALK, ROS1, BRAF 600. PDL1 TPS <1%.  Genestrat from blood confirmed no RET fusions or KRAS mutatio Medical History:   Diagnosis Date   • Arthritis, degenerative 5/11/2015   • Coronary atherosclerosis    • Essential hypertension 9/21/2015   • Hyperlipidemia 5/11/2015   • Malignant pleural effusion 5/9/2019   • [facility] MG Oral Tab Take 81 mg by mouth daily.  Disp:  Rfl:    Pravastatin Sodium 20 MG Oral Tab TAKE 1 TABLET BY MOUTH IN THE EVENING Disp: 90 tablet Rfl: 1     Allergies:     Pcn [Penicillins]       SWELLING    Psychosocial History:  Social History    Social Hist petechiae  Lines: +portacath- no erythema, induration, or drainage    Laboratory:  Lab Results   Component Value Date    WBC 11.8 (H) 07/16/2019    WBC 1.2 (L) 07/10/2019    WBC 1.8 (L) 07/05/2019    HGB 8.5 (L) 07/16/2019    HGB 7.4 (L) 07/10/2019    HGB controlling symptoms related to uncontrolled cancer growth. Appears to have ongoing stable disease with treatment other than left pleural effusion continues to reaccumulate.  Treatment c/b severe fatigue, dysgeusia, more severe cytopenias over the last cyc off antihypertensives, continue to hold, monitor BP. *immunotherapy mediated hyperthyroidism/thyroiditis  -TSH and FT4 appear to be evolving,  expect this will eventually develop into hypothyroidism and will require levothyroxine replacement.   Hyperthyr

## 2019-07-24 NOTE — PROGRESS NOTES
Hematology/Oncology Clinic Follow Up Visit    Patient Name: Kedar Welch Record Number: [id number]    YOB: 1950    PCP: Dr. Chris Appiah   Other Providers: Dr. Collette Dimmer (cardiology), Dr. Merlin Dike (pulm), ADALI Walker (pal adenocarcinoma compatible with lung primary.  -5/6/19- left thoracentesis- cytology is positive for metastatic adenocarcinoma compatible with lung primary. Lung mutation panel showed no  mutations involving EGFR, ALK, ROS1, BRAF 600. PDL1 TPS <1%.  G improvement in her baseline dyspnea on exertion since the thora. Wasn't painful like her 1st thoracentesis was. Energy is improving. Still with dysgeusia, but less since increased marinol to 5mg BID. No side effects with higher dose of marinol.   Still daily. Disp:  Rfl:    Albuterol Sulfate  (90 Base) MCG/ACT Inhalation Aero Soln Inhale 2 puffs into the lungs every 6 (six) hours as needed for Wheezing.  Disp:  Rfl:    Prochlorperazine Maleate (COMPAZINE) 10 mg tablet Take 1 tablet (10 mg total) by : 89.8 kg (198 lb)  07/10/19 : 90.7 kg (200 lb)  07/01/19 : 92.3 kg (203 lb 6.4 oz)  06/26/19 : 92.5 kg (204 lb)    ECOG PS: 2    Physical Examination:  General: Patient is alert and oriented  Psych: Mood and affect are appropriate  Eyes: EOMI  ENT: Puja Waggoner (H) 06/05/2019    .4 (H) 05/16/2019     Lab Results   Component Value Date    TSH 0.450 07/24/2019    TSH 0.175 (L) 06/26/2019    TSH 0.035 (L) 06/05/2019    TSH 0.115 (L) 05/16/2019     Lab Results   Component Value Date    T4F 1.1 07/24/2019    T4 reaccumulate would consider pleurodesis vs pleurX. Follows with Dr. Jami Grande. *normocytic anemia- due to chemotherapy  -likely contributing to her fatigue.   Required transfusion last cycle, anticipate will likely need additional transfusional support th and possible infusion/transfusion. Set up f/u with ADALI Pearson in pall care clinic as well.      Risk level: High- metastatic lung cancer on active chemoimmunotherapy requiring close monitoring    Coreen Jefferson MD  Hematology/Medical Oncology  Bereniceadelina Aviles

## 2019-07-24 NOTE — PROGRESS NOTES
Pt here for C 4 D 1 Keytruda/alimta/carboplatin. Arrives Ambulating with walker, accompanied by Self and Family member           Modifications in dose or schedule: Yes, dose for alimta and carbo adjusted due to kidney function based on todays labs.      Fr

## 2019-07-25 PROBLEM — R53.83 FATIGUE DUE TO TREATMENT: Status: ACTIVE | Noted: 2019-01-01

## 2019-07-25 PROBLEM — D64.9 NORMOCYTIC ANEMIA: Status: ACTIVE | Noted: 2019-01-01

## 2019-07-25 NOTE — PROGRESS NOTES
Education Record    Learner:  Patient and Family Member    Disease / Diagnosis: fulphila injection    Barriers / Limitations:  None   Comments:    Method:  Brief focused   Comments:    General Topics:  Plan of care reviewed   Comments:    Outcome:  Shows u

## 2019-07-26 NOTE — TELEPHONE ENCOUNTER
Shannan Rosenbaum MD  P Edw Paulino Valadez Rns Cc: Cesilia Dawkins, RN; Prema Earl, YUNG                 I can't remember if I asked you to start the medical marijuana registration process for her or not, but if I didn't, we need to. [facility] contact her and c

## 2019-07-30 NOTE — PROGRESS NOTES
Education Record    Learner:  Patient    Disease / [employer]    Barriers / Limitations:  None    Method:  Brief focused, printed material and  reinforcement    General Topics:  Plan of care reviewed    Outcome:  Shows understanding.  Pt tolerated prb

## 2019-07-30 NOTE — PROGRESS NOTES
Patient presents with: Follow - Up: apn assessment    Patient c/o feeling fatigue, shaking with lose of appetite stating only drinking fluids and 1 ensure daily. Patient denies nausea vomiting or diarrhea at this time.     Education Record    Learner:  Melissa Ansari

## 2019-08-01 NOTE — TELEPHONE ENCOUNTER
Called pt to remind her to get labs when she comes for outpatient tests tomorrow. She is getting an xray and MRI. She has standing orders for CBC, COMP and Mag. Told pt to alert them at check in she also needs labs.  Pt does have a port and I asked her if s

## 2019-08-01 NOTE — PROGRESS NOTES
ANP Visit Note    Patient Name: Cathy Nguyen   YOB: 1950   Medical Record Number: [id number]   CSN: [id number]   Date of visit: 8/1/2019     Chief Complaint/Reason for Visit: Follow up / Metastatic NSCLC     Oncologic History:  *Metastatic BRIAN lung primary. Lung mutation panel showed no  mutations involving EGFR, ALK, ROS1, BRAF 600. PDL1 TPS <1%. Genestrat from blood confirmed no RET fusions or KRAS mutations and further confirmed EGFR, ALK, ROS1, BRAF mutations. Veristrat good.   -5/9/1 complaints. Problem List:  Patient Active Problem List:     Hyperlipidemia     Obesity (BMI 30-39. 9)     Essential hypertension     S/P PTCA (percutaneous transluminal coronary angioplasty)     Familial hypercholesterolemia     Dyspnea on exertion KNEE REPLACEMENT Bilateral        Allergies:    Pcn [Penicillins]       SWELLING    Family History:  Family History   Problem Relation Age of Onset   • Other (Other) Father         old age   • Renal Disease Mother    • Cancer Sister 79        CNS lymphoma Belt: Not Asked        Special Diet: Not Asked        Stress Concern: Not Asked        Weight Concern: Not Asked    Social History Narrative      . Has 3 adult children, 6 grandchildren, all live locally. She lives with her oldest daughter.   Work the the HPI. Performance Status: ECOG 1     Physical Examination:  General: Patient is alert and oriented x 3, not in acute distress.   Vital Signs  Vitals History 7/30/2019 7/30/2019 7/30/2019 7/30/2019   /75 120/78 134/76 140/74   BP Location Lef Date: 07/30/2019  Value: 1.85*       Ref range: 0.55 - 1.02 mg/dL  Status: Final  BUN/CREA Ratio                                Date: 07/30/2019  Value: 14.1        Ref range: 10.0 - 20.0        Status: Final  Calcium, Total Status: Final  Antibody Screen                               Date: 07/30/2019  Value: Negative      Status: Final  WBC                                           Date: 07/30/2019  Value: 6.8         Ref range: 4.0 - 11.0 x10(3*  Status: Final  RBC Date: 07/30/2019  Value: 0.00        Ref range: 0.00 - 0.70 x10(*  Status: Final  Basophil Absolute                             Date: 07/30/2019  Value: 0.02        Ref range: 0.00 - 0.20 x10(*  Status: Final  Immature Granulocyte Absolute Status: Final-Edited  Blood Type Barcode                            Date: 07/31/2019  Value: 0600          Status: Final-Edited  ------------    Impression/Plan:    Elevated AlK Phos: Repeat lab value on Friday     Metastatic NSCLC : cycle 4 of

## 2019-08-02 NOTE — PROGRESS NOTES
Pt seen by ADALI Gonzalez. VSS. Pt to return on Monday morning for CBC and PRBC transfusion. Plt transfusion PRN for pleurex exchange scheduled on Tuesday per ADALI.

## 2019-08-05 NOTE — PROGRESS NOTES
Pt here for blood transfusion.   Arrives Ambulating independently, accompanied by Family member           Modifications in dose or schedule: No     Frequency of blood return and site check throughout administration: Prior to administration and At completion

## 2019-08-05 NOTE — PROGRESS NOTES
ANP Visit Note    Patient Name: Bren Rinaldi   YOB: 1950   Medical Record Number: [id number]   CSN: [id number]   Date of visit: 8/5/2019       Chief Complaint/Reason for Visit: Follow up / Metastatic NSCLC     Oncologic History:  *Metastatic with lung primary. Lung mutation panel showed no  mutations involving EGFR, ALK, ROS1, BRAF 600. PDL1 TPS <1%. Genestrat from blood confirmed no RET fusions or KRAS mutations and further confirmed EGFR, ALK, ROS1, BRAF mutations. Veristrat good.   - aches or pains. No other new complaints today. Problem List:  Patient Active Problem List:     Hyperlipidemia     Obesity (BMI 30-39. 9)     Essential hypertension     S/P PTCA (percutaneous transluminal coronary angioplasty)     Familial hypercholester TONSILLECTOMY      2nd grade   • TOTAL KNEE REPLACEMENT Bilateral        Allergies:    Pcn [Penicillins]       SWELLING    Family History:  Family History   Problem Relation Age of Onset   • Other (Other) Father         old age   • Renal Disease Mother Exercise: Not Asked        Seat Belt: Not Asked        Special Diet: Not Asked        Stress Concern: Not Asked        Weight Concern: Not Asked    Social History Narrative      . Has 3 adult children, 6 grandchildren, all live locally.   She li positives and negatives noted in the the HPI. Performance Status: ECOG 0     Physical Examination:  General: Patient is alert and oriented x 3, not in acute distress.   Vital Signs:  Vitals History 8/5/2019 8/5/2019 8/5/2019 8/5/2019   /80 126/75 Date: 08/05/2019  Value: 15.8*       Ref range: 11.0 - 15.0 %      Status: Final  RDW-SD                                        Date: 08/05/2019  Value: 51.2*       Ref range: 35.1 - 46.3 fL     Status: Final  Neutrophil Absolute Prelim 08/05/2019  Value: 131*        Ref range: 70 - 99 mg/dL      Status: Final  Sodium                                        Date: 08/05/2019  Value: 134*        Ref range: 136 - 145 mmol/L   Status: Final  Potassium                                     Date: Ref range: 55 - 142 U/L       Status: Final  Bilirubin, Total                              Date: 08/05/2019  Value: 1.0         Ref range: 0.1 - 2.0 mg/dL    Status: Final  Total Protein                                 Date: 08/05/2019  Value: 7.5

## 2019-08-05 NOTE — PROGRESS NOTES
Reviewed CBC results with APN. Proceed with 1 unit PRBC as planned. Recheck CBC- give 2nd unit for HGB less than 7.0  Pt had diarrhea this am, order obtained for Imodium- given to patient.

## 2019-08-07 NOTE — PROGRESS NOTES
Patient presents with: Follow - Up: APN assessment - lab follow up    Pt is here for follow up. Pt was seen in cancer center on Monday and required transfusion for low Hgb. She reports she felt much better yesterday; today not as great.  Her legs feel \"sh

## 2019-08-07 NOTE — PROGRESS NOTES
Cancer Center Progress Note    Patient Name: Katina Hernandez   YOB: 1950   Medical Record Number: [id number]   CSN: [id number]   Date of visit: 8/7/2019  Provider: YUNG Arellano  Referring Physician: Stephen Duncan    Problem List: be entirely excluded.  There is a subtle nonspecific area of sclerosis in the T10 vertebral body which probably just represents a bone island.  -4/26/19- PET/CT-  Left upper lobe lung mass associated with atelectatic left upper lobe with abnormal FDG [place]irst[place] Child pulmonary embolism.  Persistent large loculated left hemithorax pleural effusion with marked volume loss. AP window enlarged lymph nodes are stable.  Paratracheal enlarged lymph node has mildly decreased in size.  Small right pleural effusion has mildly inc dexamethasone (DECADRON) 4 MG tablet, Take 1 tablet (4 mg total) by mouth 2 (two) times daily.  Take 4mg twice daily the day before chemo, the day of chemo, & the day after chemo., Disp: 18 tablet, Rfl: 6  •  umeclidinium-vilanterol (ANORO ELLIPTA) 62.5-25 Time: 08/07/19  8:18 AM   Result Value Ref Range    WBC 1.2 (L) 4.0 - 11.0 x10(3) uL    RBC 2.73 (L) 3.80 - 5.30 x10(6)uL    HGB 7.9 (L) 12.0 - 16.0 g/dL    HCT 24.5 (L) 35.0 - 48.0 %    PLT 13.0 (L) 150.0 - 450.0 10(3)uL    MCV 89.7 80.0 - 100.0 fL    [facility] Imaging:  PROCEDURE:  MRI BRAIN (W+WO) (CPT=70553)     COMPARISON:  [facility] , MRI BRAIN (W+WO) (CPT=70553), 5/09/2019, 14:02. [facility] , MRI BRAIN (W+WO) (CPT=70553), 6/25/2019, 10:16.      INDICATIONS:  C34.92 Malignant neoplasm of unspecified part of (CPT=71046)     INDICATIONS:  R06.09 Other forms of dyspnea J44.9 Chronic obstructive pulmonary disease, unspecified C34.90 Malignant neoplasm of unspecified part of unspecified bronchus or*     COMPARISON:  [facility] , XR CHEST AP PORTABLE  (CPT=71045), 7/18 psychosocial intervention required at this time. Risk level:  High-lung cancer on chemotherapy, requiring intervention and close monitoring.     Joan Baez, [address] [place] [address] [facility]

## 2019-08-07 NOTE — PROGRESS NOTES
Education Record    Learner:  Patient and Family Member    Disease / Diagnosis: Lung CA    Barriers / Limitations:  None   Comments:    Method:  Brief focused   Comments:    General Topics:  Medication, Side effects and symptom management and Plan of care

## 2019-08-09 NOTE — PROGRESS NOTES
Education Record    Learner:  Patient    Disease / Luciana fuller    Barriers / Limitations:  None   Comments:    Method:  Discussion and Reinforcement   Comments:    General Topics:  Procedure, Plan of care reviewed and Fall risk and prevention   Comme

## 2019-08-12 NOTE — PROGRESS NOTES
Patient denies shortness of breath. States she is having some \"shakiness in his legs\". Port site with nickel sized redness just above insertion site. No pain, swelling, or warmth.   [facility] apn notified of above and orders received to obtain blood

## 2019-08-12 NOTE — PROGRESS NOTES
Cancer Center Progress Note    Patient Name: Maylin Cutler   YOB: 1950   Medical Record Number: [id number]   CSN: [id number]   Date of visit: 8/12/2019  Provider: YUNG TESFAYE  Referring Physician: Marleen Robbins    Problem List: be entirely excluded.  There is a subtle nonspecific area of sclerosis in the T10 vertebral body which probably just represents a bone island.  -4/26/19- PET/CT-  Left upper lobe lung mass associated with atelectatic left upper lobe with abnormal FDG [place] pulmonary embolism.  Persistent large loculated left hemithorax pleural effusion with marked volume loss. AP window enlarged lymph nodes are stable.  Paratracheal enlarged lymph node has mildly decreased in size.  Small right pleural effusion has mildly inc Prochlorperazine Maleate (COMPAZINE) 10 mg tablet, Take 1 tablet (10 mg total) by mouth every 6 (six) hours as needed for Nausea., Disp: 30 tablet, Rfl: 3  •  Ondansetron HCl (ZOFRAN) 8 MG tablet, Take 1 tablet (8 mg total) by mouth every 8 (eight) hours a Value Ref Range    WBC 13.1 (H) 4.0 - 11.0 x10(3) uL    RBC 2.82 (L) 3.80 - 5.30 x10(6)uL    HGB 8.1 (L) 12.0 - 16.0 g/dL    HCT 24.6 (L) 35.0 - 48.0 %    .0 150.0 - 450.0 10(3)uL    MCV 87.2 80.0 - 100.0 fL    MCH 28.7 26.0 - 34.0 pg    MCHC 32.9 3 the patient's emotional well-being and any concerns about anxiety or depression. No acute psychosocial intervention required at this time. Risk level:  High-lung cancer on chemotherapy, requiring intervention and close monitoring.     [employer],

## 2019-08-13 NOTE — OR NURSING
Kristina Amaral from [address] [place] [address] at bedside to discuss plan of care with patient and patient's sister.

## 2019-08-13 NOTE — OPERATIVE REPORT
Abrilpepe Gramajo Patient Status:  Hospital Outpatient Surgery    1950 MRN [id number]   [facility] ENDOSCOPY Attending Yane Pepe MD   [facility] Day # 0 PCP Lisa Napoles MD         OPERATIVE REPORT      DATE OF OPERATION: 19     OK plain was instilled subcutaneously and then deeper with aspiration of serosanguinous color fluid. Additional lidocaine was instilled subcutaneously on an anterior/inferior track.  A 1 cm subcutaneous incision was made at the initial site of fluid aspiration

## 2019-08-13 NOTE — INTERVAL H&P NOTE
Pre-op Diagnosis: PLEURAL EFFUSION, LUNG CANCER    The above referenced H&P was reviewed by Dulce Ogden MD on 8/13/2019, the patient was examined and no significant changes have occurred in the patient's condition since the H&P was performed.   I discus

## 2019-08-13 NOTE — CM/SW NOTE
MSW received call from [facility] in Endoscopy. Patient will need to dc home today with Sarah Moreland for new pleurex cath. Patient lives in [place]. Referral sent to [facility]. Lialeila Boggs will meet with the patient prior to dc today.   She is on her way

## 2019-08-13 NOTE — DISCHARGE SUMMARY
Outpatient Surgery Brief Discharge Summary         Patient ID:  Katina Hernandez  [id number]  77year old  6/8/1950    Discharge Diagnoses: PLEURAL EFFUSION, LUNG CANCER    Procedures: left pleurx catheter placement    Discharged Condition: stable    Disposit

## 2019-08-13 NOTE — PROGRESS NOTES
Right chest portacath reddened. Pt claimed it was cultured at her doctor's office yesterday. No fever or pain.

## 2019-08-14 PROBLEM — R79.89 LOW TSH LEVEL: Status: ACTIVE | Noted: 2019-01-01

## 2019-08-14 PROBLEM — R53.81 PHYSICAL DECONDITIONING: Status: ACTIVE | Noted: 2019-01-01

## 2019-08-14 NOTE — PROGRESS NOTES
Outpatient Oncology Care Plan  Problem list:  diarrhea  loss of appetite  fatigue  knowledge deficit    Problems related to:    chemotherapy  disease/disease progression  side effect of treatment    Interventions:  emotional support given  encourage activi

## 2019-08-14 NOTE — PROGRESS NOTES
Hematology/Oncology Clinic Follow Up Visit    Patient Name: Kedar Welch Record Number: [id number]    YOB: 1950    PCP: Dr. Silas Hough   Other Providers: Dr. Alfred Díaz (cardiology), Dr. Nestor Ford (pulm), ADALI Carcamo (pal adenocarcinoma compatible with lung primary.  -5/6/19- left thoracentesis- cytology is positive for metastatic adenocarcinoma compatible with lung primary. Lung mutation panel showed no  mutations involving EGFR, ALK, ROS1, BRAF 600. PDL1 TPS <1%.  G insuff)    ===================================  Interval events: Presents for follow-up, and for cycle 5 chemoimmunotherapy today. She reports feeling better overall. Still with poor taste but is improving. Just started trying medical MJ for taste.   Amee Home Medications:    Potassium Chloride ER 20 MEQ Oral Tab CR Take 20 mEq by mouth 2 (two) times daily. Disp: 60 tablet Rfl: 3   dexamethasone (DECADRON) 4 MG tablet Take 1 tablet (4 mg total) by mouth 2 (two) times daily.  Take 4mg twice daily the day Disease Mother    • Cancer Sister 79        CNS lymphoma     Review of Systems:  A 10-point ROS was done with pertinent positives and negative per the HPI    Vital Signs:  Height: 170.2 cm (5' 7.01\") (08/14 0940)  Weight: 86.1 kg (189 lb 12.8 oz) (08/14 0 08/14/2019    GFRAA 33 (L) 08/14/2019    CA 8.7 08/14/2019    OSMOCALC 278 08/14/2019    ALKPHO 146 (H) 08/14/2019    AST 17 08/14/2019    ALT 11 (L) 08/14/2019    BILT 0.6 08/14/2019    TP 6.8 08/14/2019    ALB 2.5 (L) 08/14/2019    GLOBULIN 4.3 08/14/201 c/a/p next after cycle 5- order placed  -will plan a larger NGS mutation panel testing of her tissue in the future.   -she will continue to follow with palliative care- ADALI Keita as well    *brain metastasis  -small, asymptomatic.  Stable/slightly smal

## 2019-08-14 NOTE — PROGRESS NOTES
Pt here for C5D1. Arrives Ambulating with walker, accompanied by Friend           Patient denies possible pregnancy since last therapy cycle: Yes. Modifications in dose or schedule: Yes.   Douglas Melendez only today per MD.     Frequency of blood return and si

## 2019-08-14 NOTE — PROGRESS NOTES
RN Mariana Preston informed  that Patient has [address] with [employer], but Doctor wants to confirm PT was ordered.   called Elio Caceres at [employer] (P# [phone]) who confirmed the original order included Nursing and Therapy, but

## 2019-08-19 NOTE — PROGRESS NOTES
Education Record    Learner:  Patient    Disease / Diagnosis: Lung CA/hypomagnesemia - IV magnesium replacement in IVF    Barriers / Limitations:  None   Comments:    Method:  Brief focused and Reinforcement   Comments:    General Topics:  Plan of care rev

## 2019-08-19 NOTE — PROGRESS NOTES
Patient here this am to see NP and possible transfusion. States here oral intake in possibly inadequete. Urinates well, runny stool daily - no blood nor black stool. Scheduled to have CT Scan next Monday.

## 2019-08-19 NOTE — PROGRESS NOTES
ANP Visit Note    Patient Name: You Infante   YOB: 1950   Medical Record Number: [id number]   CSN: [id number]   Date of visit: 8/19/2019       Chief Complaint/Reason for Visit:  No chief complaint on file.      ***    History of Present Illne TRANSLUMINAL CORONARY ANGIOPLASTY  2000    x1   • KNEE REPLACEMENT SURGERY     • KNEE TOTAL REPLACEMENT Right 4/24/2018    Performed by Shayan Martins MD at [address]   • KNEE TOTAL REPLACEMENT Left 2/13/2018    Performed by Shayan Martins MD at [facility] Intimate partner violence:        Fear of current or ex partner: Not on file        Emotionally abused: Not on file        Physically abused: Not on file        Forced sexual activity: Not on file    Other Topics      Concerns:        Caffeine Concern: Not and negatives noted in the the HPI. Performance Status: ***    Physical Examination:  General: Patient is alert and oriented x 3, not in acute distress. Vital Signs: There were no vitals taken for this visit. HEENT: EOMs intact. PERRL.  Oropharynx is c 08/19/2019  Value: 8.5         Ref range: 8.5 - 10.1 mg/dL   Status: Final  Calculated Osmolality                         Date: 08/19/2019  Value: 280         Ref range: 275 - 295 mOsm/kg  Status: Final  GFR, Non-                     Date: Date: 08/19/2019  Value: 11.2*       Ref range: 4.0 - 11.0 x10(3*  Status: Final  RBC                                           Date: 08/19/2019  Value: 2.71*       Ref range: 3.80 - 5.30 x10(*  Status: Final  HGB 08/19/2019  Value: 0.04        Ref range: 0.00 - 0.20 x10(*  Status: Final  Immature Granulocyte Absolute                 Date: 08/19/2019  Value: 0.19        Ref range: 0.00 - 1.00 x10(*  Status: Final  Neutrophil %                                  Date:

## 2019-08-26 NOTE — PROGRESS NOTES
Patient requested for  to complete Medical Cannabis Form as previous one was missing information.  completed form and had Doctor sign in [employer] as indicated. Form handed to Patient in treatment room for her to resubmit.  Patient

## 2019-08-26 NOTE — PROGRESS NOTES
Education Record    Learner:  Patient    Disease / Diagnosis: Hypomagnesemia - IV magnesium replacement     Barriers / Limitations:  None   Comments:    Method:  Brief focused and Reinforcement   Comments:    General Topics:  Plan of care reviewed   Ceci

## 2019-09-04 NOTE — PROGRESS NOTES
Hematology/Oncology Clinic Follow Up Visit    Patient Name: Kedar Welch Record Number: [id number]    YOB: 1950    PCP: Dr. Savanna Guzmán   Other Providers: Dr. Nikunj Ulloa (cardiology), Dr. Magy Castro (pulm), ADALI Allen (pal adenocarcinoma compatible with lung primary.  -5/6/19- left thoracentesis- cytology is positive for metastatic adenocarcinoma compatible with lung primary. Lung mutation panel showed no  mutations involving EGFR, ALK, ROS1, BRAF 600. PDL1 TPS <1%.  G insuff)  -8/26/19- CT c/a/p- Improvement in the left chest following chest tube placement. The continues to be sizable pleural effusion, probably partially loculated with air in the pleural spaces, and the air could be from the chest tube being in place. SITS TO REST   • Stage IV adenocarcinoma of lung, left ([facility] [address] 5/9/2019   • Visual impairment     glasses for reading     Past Surgical History:   Procedure Laterality Date   • ARTHROSCOPY OF JOINT UNLISTED Left 2001    KNEE   • CATH YOUNG Avelar live locally. She lives with her oldest daughter. Works as a hairdresser.        Social History    Tobacco Use      Smoking status: Former Smoker        Packs/day: 1.00        Years: 30.00        Pack years: 30        Types: Cigarettes        Start date: 08/26/2019    WBC 11.2 (H) 08/19/2019    HGB 7.2 (L) 09/04/2019    HGB 7.7 (L) 08/26/2019    HGB 8.1 (L) 08/19/2019    HCT 22.0 (L) 09/04/2019    MCV 92.8 09/04/2019    MCH 30.4 09/04/2019    MCHC 32.7 09/04/2019    RDW 17.7 (H) 09/04/2019    .0 09/ RETHE 32.3 07/24/2019    RETHE 29.2 05/29/2019     Lab Results   Component Value Date    MICHAELA 1,431.8 (H) 09/04/2019    MICHAELA 1,189.6 (H) 07/24/2019    MICHAELA 753.7 (H) 05/22/2019     Lab Results   Component Value Date    SAT 33 09/04/2019    SAT 21 07/24/2019 brain.   -cont to monitor only for now. Reserve RT as option if progresses. -repeat MRI brain next after cycle 7 or 8.      *malignant left pleural effusion  -s/p multiple prior thoracenteses, pleurX placed 8/13/19  -Follows with Dr. Fiona Wilcox    *normocyt

## 2019-09-04 NOTE — PROGRESS NOTES
Pt here for Veverly Felixlough. Arrives Ambulating with walker, accompanied by Family member           Patient denies possible pregnancy since last therapy cycle: Not Applicable    Modifications in dose or schedule: Yes - alimta removed, [place] given.

## 2019-09-04 NOTE — PROGRESS NOTES
Outpatient Oncology Care Plan  Problem list:  loss of appetite  respiratory  fatigue  knowledge deficit    Problems related to:    chemotherapy  disease/disease progression  side effect of treatment    Interventions:  emotional support given  nausea/vomiti

## 2019-09-11 NOTE — PROGRESS NOTES
Education Record    Learner:  Patient and Family Member    Disease / Diagnosis:IVF, Magnesium    Barriers / Limitations:  None   Comments:    Method:  Discussion   Comments:    General Topics:  Medication, Side effects and symptom management and Plan of ca

## 2019-09-12 NOTE — PROGRESS NOTES
Palliative Care Follow Up Note     Patient Name: Luna Fontana   YOB: 1950   Medical Record Number: [id number]   CSN: [id number]   Date of visit: 6/27/2019     Chief Complaint/Reason for Visit:  palliativ follow up     History of Present Illne 5/11/2015   • Osteoarthritis    • Personal history of antineoplastic chemotherapy    • PONV (postoperative nausea and vomiting)    • Shortness of breath     NO O2 AVAILABLE, SITS TO REST   • Stage IV adenocarcinoma of lung, left ([facility] [address] 5/9/2019   • Visual i 20 MEQ Oral Tab CR Take 20 mEq by mouth 2 (two) times daily. Disp: 60 tablet Rfl: 3   dexamethasone (DECADRON) 4 MG tablet Take 1 tablet (4 mg total) by mouth 2 (two) times daily.  Take 4mg twice daily the day before chemo, the day of chemo, & the day after cannabis as this has been helpful and she is having no SE. Symptoms are controlled with current plan. Encouraged completion of advanced directives        Impression/Plan:   1. Nausea  zofran 8mg TID prn  Compazine 10mg Q 6 prn  Medical cannabis    2.  L

## 2019-09-12 NOTE — PROGRESS NOTES
Pt meeting definition for SEVERE MALNUTRITION in the context of chronic illness as evidenced by 11% unplanned wt loss x 8 wks and po intake meeting less than 50% estimated nutrition needs.      NUTRITION F/U NOTE:      Patient Name: Sharyn Herndonnel  Date

## 2019-09-13 NOTE — TELEPHONE ENCOUNTER
Toxicities:  C6 D1 Pembrolizumab on 9/4/2019    Maxi Neal RN - [employer] calling with condition update. Condition Update: New Bilateral  LE Edema    BIlateral LE Edema: (Pt has been feeling better.  She is moving & has been up on her feet sta

## 2019-09-25 NOTE — PROGRESS NOTES
Outpatient Oncology Care Plan  Problem list:  diarrhea  loss of appetite  fatigue  knowledge deficit  nausea and vomiting    Problems related to:    chemotherapy  disease/disease progression  side effect of treatment    Interventions:  optimize nutrition s

## 2019-09-25 NOTE — PROGRESS NOTES
Patient with magnesium level of 1.2 today - Dr. Epi Webb notified and ordered 4g IV magnesium to be given today.  Patient only to receive immunotherapy today per MD. Patient following with Dr. Kennedi Felipe, pulmonologist, who requested patient have pleurx catheter

## 2019-09-25 NOTE — PROGRESS NOTES
Hematology/Oncology Clinic Follow Up Visit    Patient Name: Kedar Welch Record Number: [id number]    YOB: 1950    PCP: Dr. Helen Campos   Other Providers: Dr. Carlos Garner (cardiology), Dr. Quinten Winslow (pulm), ADALI Cuellar (pal adenocarcinoma compatible with lung primary.  -5/6/19- left thoracentesis- cytology is positive for metastatic adenocarcinoma compatible with lung primary. Lung mutation panel showed no  mutations involving EGFR, ALK, ROS1, BRAF 600. PDL1 TPS <1%.  G insuff)  -8/26/19- CT c/a/p- Improvement in the left chest following chest tube placement. The continues to be sizable pleural effusion, probably partially loculated with air in the pleural spaces, and the air could be from the chest tube being in place. • Coronary atherosclerosis    • Essential hypertension 9/21/2015   • Heart attack [facility])    • High cholesterol    • History of blood transfusion 07/30/2019   • Hyperlipidemia 5/11/2015   • Malignant pleural effusion 5/9/2019   • MI (myocardial infarction) (COMPAZINE) 10 mg tablet Take 1 tablet (10 mg total) by mouth every 6 (six) hours as needed for Nausea. Disp: 30 tablet Rfl: 3   Ondansetron HCl (ZOFRAN) 8 MG tablet Take 1 tablet (8 mg total) by mouth every 8 (eight) hours as needed for Nausea.  Disp: 30 t 12.8 oz)  08/14/19 : 86.1 kg (189 lb 12.8 oz)    ECOG PS: 2    Physical Examination:  General: Patient is alert and oriented  Psych: Mood and affect are just slightly down  Eyes: EOMI  ENT: Oropharynx is clear  CV: Regular rate and rhythm, no murmurs, no L .4 (H) 06/05/2019    .4 (H) 05/16/2019     Lab Results   Component Value Date    TSH 0.740 09/25/2019    TSH 0.457 09/04/2019    TSH 0.237 (L) 08/14/2019    TSH 0.450 07/24/2019    TSH 0.175 (L) 06/26/2019    TSH 0.035 (L) 06/05/2019    TS repeat thoracentesis with improvement after C3. Treatment c/b severe fatigue, dysgeusia, more severe cytopenias.  S/p pleurX placement after C4. CT c/a/p on 8/26/19 shows ongoing stable disease but improvement in effusion s/p pleurX placement  -proceed with mediated hyperthyroidism/thyroiditis  -TFTs normal today. Repeat next in 6-12 weeks    *chemotherapy induced nausea  -better controlled lately. zofran and compazine prn    *poor PO intake, dysgeusia, wt loss  -remains on ongoing issue.   Was better when sh

## 2019-09-26 PROBLEM — R45.89 DEPRESSED MOOD: Status: ACTIVE | Noted: 2019-01-01

## 2019-09-27 NOTE — PROGRESS NOTES
Pt meeting definition for SEVERE MALNUTRITION in the context of chronic illness as evidenced by 11% unplanned wt loss x 8 wks and po intake meeting less than 50% estimated nutrition needs.      NUTRITION F/U NOTE:      Patient Name: Sharyn Weir Lakhwinder  Date of pallet; she noted she would try. RD also suggested she try OTC TheraBreath Mints. Pt stated she would try. RD continues to offer support/encouragement. RD will continue to follow.

## 2019-09-30 NOTE — TELEPHONE ENCOUNTER
Anais Tovar said that she was calling to speak with Francisca. She said that she had to cancel and appointment today and that she was told to reach back out and speak with her. Please advise.

## 2019-09-30 NOTE — TELEPHONE ENCOUNTER
Spoke with patient to check on her since she cancelled her appointment today. She states she can't come in because she is feeling weak and tired. She fell yesterday. She didn't go to ED and currently denies any pain. She denies hitting her head.  No cut

## 2019-10-02 NOTE — PROGRESS NOTES
Education Record    Learner:  Patient    Disease / Diagnosis: Lung CA - here for APN and labs, with fluids.     Barriers / Limitations:  None   Comments:    Method:  Discussion   Comments:    General Topics:  Medication, Pain, Side effects and symptom manag

## 2019-10-02 NOTE — PROGRESS NOTES
SW met with patient to discuss limitations in the home. Patient is a high fall risk per staff and patient and she lives alone with minimal supports. Patient requested information on meals on wheels and PT.  Patient reports that she has a bad experience in [place]

## 2019-10-03 NOTE — PROGRESS NOTES
Palliative Care Follow Up Note     Patient Name: Abril Gramajo   YOB: 1950   Medical Record Number: [id number]   CSN: [id number]   Date of visit: 6/27/2019     Chief Complaint/Reason for Visit:  palliativ follow up     History of Present Illne atherosclerosis    • Essential hypertension 9/21/2015   • Heart attack [facility])    • High cholesterol    • History of blood transfusion 07/30/2019   • Hyperlipidemia 5/11/2015   • Malignant pleural effusion 5/9/2019   • MI [facility]) ([facility] 08/200 Alcohol use: No        Frequency: Never        Binge frequency: Never      Drug use: No    Methodist/Cultural Information:   Current living situation: Patient lives independently in a [place] community.   Her son and daughter help with driving her and sh 80%    Physical Examination:  General: Patient is alert and oriented, not in acute distress. Neuro:  intact  Respiratory: Clear to auscultation. Cardiac: Regular rate and rhythm. Abdomen: Soft, non tender with good bowel sounds.   Musculoskeletal: N

## 2019-10-16 PROBLEM — E86.0 DEHYDRATION: Status: ACTIVE | Noted: 2019-01-01

## 2019-10-16 PROBLEM — R19.7 DIARRHEA: Status: ACTIVE | Noted: 2019-01-01

## 2019-10-16 NOTE — PROGRESS NOTES
Palliative Care Follow Up Note     Patient Name: Falguni Tim   YOB: 1950   Medical Record Number: [id number]   CSN: [id number]   Date of visit: 10/16/2019     Chief Complaint/Reason for Visit:  Palliative care follow up     History of Sal transfusion 07/30/2019   • Hyperlipidemia 5/11/2015   • Malignant pleural effusion 5/9/2019   • MI (myocardial infarction) ([facility] [address] 08/2000    s/p stent placement   • Obesity (BMI 30-39. 9) 5/11/2015   • Osteoarthritis    • Personal history of antineoplastic c Information:   Current living situation: Patient lives independently in senior housing. Her son and her daughter live 15 minutes away and help her with errands. Her sister also lives 15 minutes away and drives her to her appointments.  They live over 1.5 ho unmotivated, does not believe lexapro is helping    Palliative Performance Scale:  80%    Physical Examination:  General: Patient is alert and oriented, not in acute distress. Respiratory: Clear to auscultation. Cardiac: Regular rate and rhythm.   No lalitha by:  Arturo BRO Student

## 2019-10-16 NOTE — PROGRESS NOTES
Outpatient Oncology Care Plan  Problem list:  diarrhea  loss of appetite  fatigue  knowledge deficit    Problems related to:    chemotherapy  disease/disease progression  side effect of treatment    Interventions:  provided general teaching    Expected out

## 2019-10-16 NOTE — PROGRESS NOTES
Pt here for C8D1 Keytruda. Arrives Ambulating with walker, accompanied by Family member           Patient denies possible pregnancy since last therapy cycle: Not Applicable    Modifications in dose or schedule: Yes -- received a 4g mag rider.  No alimta, o

## 2019-10-16 NOTE — PATIENT INSTRUCTIONS
Take escitalopram 5mg (1/2 tab) Thursday (10/17), Friday (10/18), Saturday (10/19) then  Take 1/2 tab Monday (10/21) and Wednesday (10/23) then STOP. Begin fluoxetine tomorrow (10/17) morning.

## 2019-10-16 NOTE — PATIENT INSTRUCTIONS
Try taking the Lomotil for diarrhea instead of Imodium  Expect to hear Dr. Mike Hughes call you about an appointment next Wednesday to remove the Pleurx  We will try to arrange IV fluids on Friday, Monday, and future Wednesdays with home health, otherwise we w

## 2019-10-16 NOTE — PROGRESS NOTES
Palliative Care Follow Up Note      Patient Name: Grecia Harris        YOB: 1950           Medical Record Number: [id number]      CSN: [id number]                     Date of visit: 10/16/2019            Chief Complaint/Reason for Visit:  Mariya Atwood disease) [facility])     • Coronary atherosclerosis     • Essential hypertension 9/21/2015   • Heart attack [facility])     • High cholesterol     • History of blood transfusion 07/30/2019   • Hyperlipidemia 5/11/2015   • Malignant pleural effusion 5/9/2019   • MI (my 18.6      Smokeless tobacco: Never Used    Substance and Sexual Activity      Alcohol use: No        Frequency: Never        Binge frequency: Never      Drug use:  No     Zoroastrian/Cultural Information:   Current living situation: Patient lives independentl Feels well. Respiratory:  Denies SOB, denies cough  Cardiac:  Denies chest pain, heart palpitations  Abdomen:  Denies constipation, + diarrhea. Denies pain. Psych:  Sleeping well.  Feeling unmotivated, does not believe lexapro is helping     Palliati 2 weeks (around 10/31/2019)     30 total minutes spent with patient >50% of visit was spent in counseling and coordination of care for symptom management.     Electronically Signed by:  Paula BRO Student       Patient seen and examined independe

## 2019-10-16 NOTE — PROGRESS NOTES
Pt meeting definition for SEVERE MALNUTRITION in the context of chronic illness as evidenced by 7% unplanned wt loss x 3 wks, 10% in 6 wks and po intake meeting less than 50% estimated nutrition needs.      NUTRITION F/U NOTE:      Patient Name: Sharyn Lizarraga does not appear to be helping. Pt noted her antidepressant will changed today per palliative care NP. Pt noted she did try OTC TheraBreath Mints and felt they did help a bit w/ dygeusia; however, she has not been using regularly.  RD reviewed information as

## 2019-10-16 NOTE — PROGRESS NOTES
Hematology/Oncology Clinic Follow Up Visit    Patient Name: Kedar Welch Record Number: [id number]    YOB: 1950    PCP: Dr. Yasmany Martínez   Other Providers: Dr. Garima Carroll (cardiology), Dr. Natanael Ortega (pulm), ADALI Blood (pal adenocarcinoma compatible with lung primary.  -5/6/19- left thoracentesis- cytology is positive for metastatic adenocarcinoma compatible with lung primary. Lung mutation panel showed no  mutations involving EGFR, ALK, ROS1, BRAF 600. PDL1 TPS <1%.  G insuff)  -8/26/19- CT c/a/p- Improvement in the left chest following chest tube placement. The continues to be sizable pleural effusion, probably partially loculated with air in the pleural spaces, and the air could be from the chest tube being in place. Malignant pleural effusion 5/9/2019   • MI (myocardial infarction) ([facility] 08/2000    s/p stent placement   • Obesity (BMI 30-39. 9) 5/11/2015   • Osteoarthritis    • Personal history of antineoplastic chemotherapy    • PONV (postoperative nausea and vomiting mg total) by mouth every 8 (eight) hours as needed for Nausea., Disp: 30 tablet, Rfl: 3  folic acid 1 MG Oral Tab, Take 1 tablet (1 mg total) by mouth daily. , Disp: 90 tablet, Rfl: 3  aspirin 81 MG Oral Tab, Take 81 mg by mouth daily. , Disp: , Rfl:   Prava Patient is alert and oriented  Psych: Mood and affect are appropriate  Eyes: EOMI  ENT: Oropharynx is clear  CV: Regular rate and rhythm, no murmurs, no LE edema  Respiratory: clear to auscultation.  +dressing over pleurX at left chest wall  GI/Abd: Soft, n TSH 0.457 09/04/2019    TSH 0.237 (L) 08/14/2019    TSH 0.450 07/24/2019    TSH 0.175 (L) 06/26/2019    TSH 0.035 (L) 06/05/2019    TSH 0.115 (L) 05/16/2019     Lab Results   Component Value Date    T4F 1.4 09/25/2019    T4F 0.9 09/04/2019    T4F 1.4 08 pleurX placement after C4. CT c/a/p on 8/26/19 shows ongoing stable disease.  -proceed with cycle 8 keytruda only today as below:   - (OMIT this TODAY d/t renal insuff, fatigue)    -Pembrolizumab 200mg IV D1  -vitamin P78 and folic acid supplementation per mag again on Friday. Repeat mag level next week and replace again prn    *HTN  -BP was running low with associated LH. Stopped lisinopril/HCTZ. BP was acceptable off antihypertensives, but today is higher. will continue to hold, monitor BP for now.

## 2019-10-17 NOTE — PROGRESS NOTES
Pt asking for preferred pharmacy to be listed at [address] [facility] over up coming apt times with pt.

## 2019-10-18 NOTE — PROGRESS NOTES
Education Record    Learner:  Patient and Family Member    Disease / Diagnosis: Lung CA - here for fluids. Still having difficulty getting out of chair/feels weak. Once she is up she is able to well okay.  Patient here on Wednesday with Mag of 1.1; was give

## 2019-10-18 NOTE — TELEPHONE ENCOUNTER
Spoke with [facility] pharmacist to provide clarification. Per ADALI Espinosa 1L NS over 2 hours. Per [facility] they run NS over 4 hours otherwise would need a pump and would be more expensive to pt. Ok to run 1 L NS over 4 hours three times a week. Jignesh alex/constantino BRO notifi

## 2019-10-23 PROBLEM — I95.9 HYPOTENSION, UNSPECIFIED HYPOTENSION TYPE: Status: ACTIVE | Noted: 2019-01-01

## 2019-10-23 PROBLEM — I95.9 HYPOTENSION: Status: ACTIVE | Noted: 2019-01-01

## 2019-10-23 PROBLEM — C34.90 MALIGNANT NEOPLASM OF LUNG, UNSPECIFIED LATERALITY, UNSPECIFIED PART OF LUNG (HCC): Status: ACTIVE | Noted: 2019-01-01

## 2019-10-23 PROBLEM — R09.02 HYPOXIA: Status: ACTIVE | Noted: 2019-01-01

## 2019-10-23 PROCEDURE — 99291 CRITICAL CARE FIRST HOUR: CPT | Performed by: INTERNAL MEDICINE

## 2019-10-23 NOTE — CONSULTS
[facility]  Cardiology Critical Care Consultation    Man Sorto Patient Status:  Emergency    1950 MRN [id number]   Location [address] Attending [place], [address] [facility] Day # 0 PCP Martin Baires MD     Reas SURGERY     • KNEE TOTAL REPLACEMENT Right 4/24/2018    Performed by Darcy Wang MD at [address]   • [address] [facility] Left 2/13/2018    Performed by Darcy Wang MD at [address] [place] [address] Left 8/13/2019    Perform wheezes, rales, rhonchi or dullness. Abdomen: Soft, non-tender. Extremities: Without clubbing, cyanosis or edema. Peripheral pulses are 2+. Neurologic: Obtunded. Skin: Warm and dry.      Laboratory Data:  Lab Results   Component Value Date    WBC 9.7 1 effusion. 4. There is diffuse wall thickening involving the colon especially the right colon suggesting either ischemic bowel or edematous changes related to hyperproteinemia. Small no obstruction. No free air.   5. The kidneys are markedly abnormal diff physician) and it did not appear to be suggestive of ACS to me and had appearance of right heart strain that was confirmed by bedside echo  -subsequent working diagnosis was for acute pulmonary embolism that was confirmed by CTA  -discussed at length with

## 2019-10-23 NOTE — PROGRESS NOTES
Hematology/Oncology Clinic Follow Up Visit    Patient Name: Kedar Welch Record Number: [id number]    YOB: 1950    PCP: Dr. Cate Yates   Other Providers: Dr. Jenny Lindsay (cardiology), Dr. Haider Byers (pulm), ADALI Mendez (pal adenocarcinoma compatible with lung primary.  -5/6/19- left thoracentesis- cytology is positive for metastatic adenocarcinoma compatible with lung primary. Lung mutation panel showed no  mutations involving EGFR, ALK, ROS1, BRAF 600. PDL1 TPS <1%.  G insuff)  -8/26/19- CT c/a/p- Improvement in the left chest following chest tube placement. The continues to be sizable pleural effusion, probably partially loculated with air in the pleural spaces, and the air could be from the chest tube being in place. Visual impairment     glasses for reading     Past Surgical History:   Procedure Laterality Date   • ARTHROSCOPY OF JOINT UNLISTED Left 2001    KNEE   • CATH PERCUTANEOUS  TRANSLUMINAL CORONARY ANGIOPLASTY  2000    x1   • KNEE REPLACEMENT SURGERY     • ENEIDA Rfl:   Pravastatin Sodium 20 MG Oral Tab, TAKE 1 TABLET BY MOUTH IN THE EVENING, Disp: 90 tablet, Rfl: 1      Allergies:     Pcn [Penicillins]       SWELLING    Psychosocial History:  Social History    Patient does not qualify to have social determinant in Grossly intact   Lymphatics: no palpable lymphadenopathy  Skin: no rashes or petechiae  Lines: +portacath- no erythema, induration or drainage    Laboratory:  Lab Results   Component Value Date    WBC 9.9 10/16/2019    WBC 4.3 10/02/2019    WBC 6.9 09/25/2 T4F 1.3 05/16/2019     Lab Results   Component Value Date    T3TOT 60 08/14/2019    T3TOT 91 05/16/2019     Lab Results   Component Value Date    REITCPERCENT 3.6 (H) 09/04/2019    REITCPERCENT 4.5 (H) 07/24/2019    REITCPERCENT 1.6 05/29/2019     Lab R consider a larger NGS mutation panel testing of her tissue in the future.   -she will continue to follow with palliative care- ADALI Kraus    *brain metastasis  -small, asymptomatic.  Stable/slightly smaller on repeat MRI brain.   -cont to monitor only f [address] [facility]

## 2019-10-23 NOTE — PROGRESS NOTES
Education Record    Learner:  Patient and Friend    Disease / Diagnosis:mwt lung ca    Barriers / Limitations:  None   Comments:    Method:  Discussion and Reinforcement   Comments:    General Topics:  Procedure, Side effects and symptom management, Plan o

## 2019-10-23 NOTE — CONSULTS
Scooter Marti [facility] [facility]  Pulmonary/Critical Care Consult Note  [facility]  Report of Consultation    Falguni Sidhuo Patient Status:  Emergency    1950 MRN [id number]   Location [address] Attending cholesterol    • History of blood transfusion 07/30/2019   • Hyperlipidemia 5/11/2015   • Malignant pleural effusion 5/9/2019   • MI (myocardial infarction) ([address] 08/2000    s/p stent placement   • Obesity (BMI 30-39. 9) 5/11/2015   • Osteoarthritis    • Pe 96 % — —   10/23/19 1454 (!) 89/66 — — (!) 125 (!) 30 98 % — —   10/23/19 1448 (!) 45/29 — — — — — — —   10/23/19 1442 — 97.8 °F (36.6 °C) Temporal (!) 135 (!) 45 99 % 5' 8\" (1.727 m) 167 lb (75.8 kg)   10/23/19 1435 — — — (!) 135 — (!) 75 % — —       Int ABGPHT 7.12*   NQUCSM2L 43   VZHVL0R 111*   ABGHCO3 13.9*   ABGBE -14.5*   TEMP 98.6   CASTILLO Positive   SITE Right Radial   DEV Non-rebreather mask   THGB 9.1*       Cultures:     No results found for this visit on 10/23/19.   No results for input(s): COL and ongoing with Dr. Alissa Sun (cardiology) and Dr. Maximo Bennett (ER).  Given her massive pulmonary embolis, typically would consider thrombolytics or thromboectomy, however given her underlying metastatic lung cancer including CNS involvement, this was felt to higher

## 2019-10-23 NOTE — ED INITIAL ASSESSMENT (HPI)
Patient to ED with sister s/p pleurex catheter removal today after 2.5 months of placement. Patient had just gotten into car with sister and started having difficulty breathing.

## 2019-10-23 NOTE — OPERATIVE REPORT
[address] Patient Status:  Hospital Outpatient Surgery    1950 MRN [id number]   [facility] ENDOSCOPY Attending No att. providers found   Hosp Day # 0 PCP Uzair Beach MD       OPERATIVE REPORT     DATE OF OPERAT

## 2019-10-23 NOTE — ED NOTES
Patient becoming lethargic. Still awake. Daughter and sister at the bedside. Dr. Lidia Owens, Dr. Palomo Alvares and Dr. Gómez Masters at the bedside discussing intubation with the family.

## 2019-10-23 NOTE — H&P
Scooter Marti [address] [facility] [address] Note [facility]  Report of Consultation    Ailin Bowman Patient Status:  Hospital Outpatient Surgery    1950 MRN [id number]   [facility] ENDOSCOPY At Performed by Pauly Yin MD at [address] [facility]   • KNEE TOTAL REPLACEMENT Left 2/13/2018    Performed by Pauly Yin MD at [address] [place] [address] Left 8/13/2019    Performed by Kimberly Hernandez MD at [facility] 188 total) by mouth daily. , Disp: 90 tablet, Rfl: 3  aspirin 81 MG Oral Tab, Take 81 mg by mouth daily. , Disp: , Rfl:   Pravastatin Sodium 20 MG Oral Tab, TAKE 1 TABLET BY MOUTH IN THE EVENING, Disp: 90 tablet, Rfl: 1  umeclidinium-vilanterol (ANORO ELLIPTA) 6 distress  PSYCH: Normal mood and affect, AAOX3  NEURO: CN 2-12 grossly intact, no focal deficits  HEENT: Atraumatic, normocephalic, EOMI, no icterus/hemorrhage, no conjunctival injection/discharge, nares normal  MOUTH: MMM, good dentition  NECK: Trachea mi given the lack of benefit with minimal output over past several weeks. We reviewed the procedure in detail including benefits and risks including and not exclusive to bleeding, infection, pneumothorax, respiratory arrest and death.  She is agreeable to proc

## 2019-10-23 NOTE — ED PROVIDER NOTES
Patient Seen in: [facility] Emergency Department      History   Patient presents with:  Dyspnea GISELLE SOB (respiratory)    Stated Complaint: SOB, post op    HPI    28-year-old female presents emergency room for evaluation of acute onset of shortness o PLEURAL CATHETERS DRAIN INSERTION Left 8/13/2019    Performed by Miko Blount MD at [facility] ENDOSCOPY   • TONSILLECTOMY      2nd grade   • TOTAL KNEE REPLACEMENT Bilateral                     Social History    Tobacco Use      Smoking status: Former Smoker for the following components:       Result Value    Glucose 206 (*)     Potassium 3.4 (*)     CO2 18.0 (*)     Creatinine 1.62 (*)     BUN/CREA Ratio 6.2 (*)     Calcium, Total 8.2 (*)     GFR, Non- 32 (*)     GFR, -American 37 (*) orders were created for panel order TYPE AND SCREEN.   Procedure                               Abnormality         Status                     ---------                               -----------         ------                     ABORH (BLOOD AVPE)[[id number] want intubation, family wants to make the patient DNR/DNI which was performed in the emergency room.   Dr. Dmitry Ayoub and Dr Maksim Nash evaluate the patient in the emergency room, discussed the possibility of TPA/catheter directed, it was decided with family to do he

## 2019-10-23 NOTE — PROGRESS NOTES
Outpatient Oncology Care Plan  Problem list:  diarrhea  loss of appetite  fatigue  knowledge deficit  nausea and vomiting    Problems related to:    disease/disease progression  side effect of treatment    Interventions:  provided general teaching    Expec

## 2019-10-24 ENCOUNTER — MED REC SCAN ONLY (OUTPATIENT)
Dept: FAMILY MEDICINE CLINIC | Facility: CLINIC | Age: 69
End: 2019-10-24

## 2019-10-24 ENCOUNTER — APPOINTMENT (OUTPATIENT)
Dept: GENERAL RADIOLOGY | Facility: HOSPITAL | Age: 69
DRG: 175 | End: 2019-10-24
Attending: INTERNAL MEDICINE
Payer: MEDICARE

## 2019-10-24 NOTE — DISCHARGE SUMMARY
[facility]  Discharge Summary/death note    Yue Rae Patient Status:  Inpatient    1950 MRN [id number]   [facility] 4SW-A Attending Alex Bloom MD   [address] Day # 0 PCP Loren Aguilar MD     Date of Admission: 10/23/2019 CNS involvement was felt to be high risk for intraconal hemorrhage and was decided not for thrombolytics or thrombectomy and after extensive discussions between cardiologist pulmonologist and ER physician patient started on anticoagulation with heparin dri

## 2019-10-24 NOTE — PROGRESS NOTES
10/23/19 1958   Clinical Encounter Type   Visited With Patient and family together  (Family present. Grief support.  Prayed at bedside. )   Routine Visit Introduction   Crisis Visit Death   Referral From Nurse   Referral To [address] [place] [address] Way Encounter

## 2019-10-24 NOTE — PROGRESS NOTES
Informed of pending expiration. Given 2mg of morphine.  within minutes. Updated Dr. Kamilah Dowd.           ADALI Bob  Critical Care Nurse Practitioner  Spectra # [phone]

## 2019-10-24 NOTE — PLAN OF CARE
RECEIVED REPORT FOR PATIENT  [facility]. DURING REPORT PATIENT HR WILLIAM TO 30'S THEN V-TACH/ASYSTOLE. FAMILY AT BEDSIDE. PATIENT DNR/DNI. ALL DRIPS STOPPED. PATIENT WITHOUT PULSE/RESPIRATIONS WITH PUPILS FIXED AT 1952. CONFIRMED WITH RN Glenora Gosselin.   [place]

## 2019-10-24 NOTE — PLAN OF CARE
1730 Received patient from ER, Patient unresponsive on BiPAP. On levo gtt titrated for SBP > 90, Heparin gtt for DVT/PE protocol. ST on monitor. Patient skin cold and mottled. 1930 patient HR went bradycardiac and eventually asystole.   Family at beds

## 2019-10-24 NOTE — H&P
[facility]                                                               History & Physical         Bren Meth Patient Status:  Inpatient    1950 MRN [id number]   [facility] 4SW-A Attending Yogesh Brown MD   [facility] Day # 0 ([facility] 08/2000    s/p stent placement   • Obesity (BMI 30-39. 9) 5/11/2015   • Osteoarthritis    • Personal history of antineoplastic chemotherapy    • PONV (postoperative nausea and vomiting)    • Shortness of breath     NO O2 AVAILABLE, SITS TO REST   • St 1 tablet (4 mg total) by mouth 2 (two) times daily.  Take 4mg twice daily the day before chemo, the day of chemo, & the day after chemo., Disp: 18 tablet, Rfl: 6, Past Week at Unknown time  umeclidinium-vilanterol (ANORO ELLIPTA) 62.5-25 MCG/INH Inhalation 10/23/2019    HGB 9.4 10/23/2019    HCT 29.5 10/23/2019    .0 10/23/2019    CREATSERUM 1.62 10/23/2019    BUN 10 10/23/2019     10/23/2019    K 3.4 10/23/2019     10/23/2019    CO2 18.0 10/23/2019     10/23/2019    CA 8.2 10/23/20 paratracheal lymph node presently measures 1.7 x 1.3 cm previously 1.3 x 0.8 cm. There are lymph nodes within the anterior mediastinum, the largest is slightly increased in size measuring 2.8 x 1.6 cm, previously 2.6   x 1.5 cm.   Stable heterogeneous enha pulmonary infarcts, acute tubular necrosis, alternatively extensive bilateral pyelonephritis is in the   differential.  There is delayed excretion of contrast.  There is no hydronephrosis. ADRENALS:  No mass or enlargement.     AORTA:  No aneurysm or disse space is stable. New small right effusion. 4. There is diffuse wall thickening involving the colon especially the right colon suggesting either ischemic bowel or edematous changes related to hyperproteinemia. Small no obstruction. No free air.   5. The

## 2019-10-30 ENCOUNTER — APPOINTMENT (OUTPATIENT)
Dept: HEMATOLOGY/ONCOLOGY | Facility: HOSPITAL | Age: 69
End: 2019-10-30
Attending: INTERNAL MEDICINE
Payer: MEDICARE

## 2019-11-04 ENCOUNTER — MED REC SCAN ONLY (OUTPATIENT)
Dept: FAMILY MEDICINE CLINIC | Facility: CLINIC | Age: 69
End: 2019-11-04

## 2019-11-06 ENCOUNTER — APPOINTMENT (OUTPATIENT)
Dept: HEMATOLOGY/ONCOLOGY | Facility: HOSPITAL | Age: 69
End: 2019-11-06
Attending: INTERNAL MEDICINE
Payer: MEDICARE

## 2020-04-29 NOTE — PROGRESS NOTES
Education Record    Learner:  Patient and Family Member    Disease / Diagnosis:labs and poss ivf    Barriers / Limitations:  None   Comments:    Method:  Discussion   Comments:    General Topics:  Procedure, Side effects and symptom management and Plan of reveiwed in office

## 2021-11-19 NOTE — TELEPHONE ENCOUNTER
Pt called LVM that she has been unable to fill her marinol. Pharmacy has given her different answers. Pt asking RN to call them and see what is going on with medication.  Called and spoke with pharmacy, PA was completed they are now waiting for insurance to What Type Of Note Output Would You Prefer (Optional)?: Standard Output How Severe Is Your Skin Lesion?: moderate Has Your Skin Lesion Been Treated?: not been treated Is This A New Presentation, Or A Follow-Up?: Skin Lesions

## 2023-10-24 NOTE — TELEPHONE ENCOUNTER
ACTIVITY:  Continue usual care with your doctor. Call your doctor immediately if any severe problems or go to the nearest emergency room. I have been treated and hereby acknowledge receiving this instruction sheet. Called [facility] and spoke with Aleksandar Jones who states that pt has #90 w/ 1 refill on file.  Will get ready and notify patient

## (undated) DEVICE — FILTERLINE NASAL ADULT O2/CO2

## (undated) DEVICE — GLOVE ORTHO ALOETOUCH SZ 8-1/2

## (undated) DEVICE — DECANTER BAG 9": Brand: MEDLINE INDUSTRIES, INC.

## (undated) DEVICE — WRAP COOLING KNEE W/ICE PILLOW

## (undated) DEVICE — Device: Brand: STABLECUT®

## (undated) DEVICE — FLUIDGARD® 160 ANTI-FOG SURGICAL MASK WITH ANTI-GLARE SHIELD: Brand: PRECEPT ®

## (undated) DEVICE — TOTAL KNEE CDS: Brand: MEDLINE INDUSTRIES, INC.

## (undated) DEVICE — GOWN,SIRUS,FABRIC-REINFORCED,X-LARGE: Brand: MEDLINE

## (undated) DEVICE — MEDI-VAC NON-CONDUCTIVE SUCTION TUBING: Brand: CARDINAL HEALTH

## (undated) DEVICE — DRAPE,U/SHT,SPLIT,FILM,60X84,STERILE: Brand: MEDLINE

## (undated) DEVICE — SYRINGE 30ML LL TIP

## (undated) DEVICE — KENDALL SCD EXPRESS SLEEVES, KNEE LENGTH, MEDIUM: Brand: KENDALL SCD

## (undated) DEVICE — GAUZE SPONGES,12 PLY: Brand: CURITY

## (undated) DEVICE — MEDI-VAC SUCTION HANDLE REGULAR CAPACITY: Brand: CARDINAL HEALTH

## (undated) DEVICE — STERILE POLYISOPRENE POWDER-FREE SURGICAL GLOVES: Brand: PROTEXIS

## (undated) DEVICE — CBCII DRAIN & TROCAR

## (undated) DEVICE — Device: Brand: POWER-FLO®

## (undated) DEVICE — GLOVE SURG TRIUMPH SZ 8-1/2

## (undated) DEVICE — VIOLET BRAIDED (POLYGLACTIN 910), SYNTHETIC ABSORBABLE SUTURE: Brand: COATED VICRYL

## (undated) DEVICE — SUTURE VICRYL PLUS 2-0 X-1

## (undated) DEVICE — CONVERTORS STOCKINETTE: Brand: CONVERTORS

## (undated) DEVICE — SPECIMEN CONTAINER,POSITIVE SEAL INDICATOR, OR PACKAGED: Brand: PRECISION

## (undated) DEVICE — DRESSING AQUACEL AG 3.5X12

## (undated) DEVICE — CAP,BOUFFANT,SPUNBOND,BLUE,24": Brand: MEDLINE INDUSTRIES, INC.

## (undated) DEVICE — 3M™ RED DOT™ MONITORING ELECTRODE WITH FOAM TAPE AND STICKY GEL, 50/BAG, 20/CASE, 72/PLT 2570: Brand: RED DOT™

## (undated) DEVICE — HOOD, PEEL-AWAY: Brand: FLYTE

## (undated) DEVICE — ZIMMER® STERILE DISPOSABLE TOURNIQUET CUFF WITH PLC, DUAL PORT, SINGLE BLADDER, 34 IN. (86 CM)

## (undated) DEVICE — 3M™ COBAN™ NL STERILE NON-LATEX SELF-ADHERENT WRAP, 2084S, 4 IN X 5 YD (10 CM X 4,5 M), 18 ROLLS/CASE: Brand: 3M™ COBAN™

## (undated) DEVICE — TOWEL: OR BLU 80/CS: Brand: MEDICAL ACTION INDUSTRIES

## (undated) DEVICE — STERILE PATIENT PROTECTIVE PAD FOR IMP® KNEE POSITIONERS & COHESIVE WRAP (10 / CASE): Brand: DE MAYO KNEE POSITIONER®

## (undated) DEVICE — GLOVE SURG SENSICARE SZ 7-1/2

## (undated) DEVICE — 1200CC GUARDIAN II: Brand: GUARDIAN

## (undated) DEVICE — PLEURX PLEURAL CATHETER AND STARTER KIT - FOR PLEURAL PLACEMENT ONLY AND FOR PATIENT HOME USE: Brand: PLEURX

## (undated) DEVICE — SCREW GUID 33MM HEX HEAD MIS: Type: IMPLANTABLE DEVICE | Site: KNEE

## (undated) DEVICE — NEEDLE SPINAL 20X3-1/2 YELLOW

## (undated) NOTE — LETTER
Marifer Haynes [address] [address]  [place], [address] [facility]    Consent for Operation  Date: __________________                                Time: _______________    1.  I authorize the performance upon Carolynn Pryor the following operation:  Procedure( procedure has been videotaped, the surgeon will obtain the original videotape. The hospital will not be responsible for storage or maintenance of this tape.   7. For the purpose of advancing medical education, I consent to the admittance of observers to the STATEMENTS REQUIRING INSERTION OR COMPLETION WERE FILLED IN.     Signature of Patient:   ___________________________    When the patient is a minor or mentally incompetent to give consent:  Signature of person authorized to consent for patient: ____________ supplements, and pills I can buy without a prescription (including street drugs/illegal medications). Failure to inform my anesthesiologist about these medicines may increase my risk of anesthetic complications. iv.  If I am allergic to anything or have ha Anesthesiologist Signature     Date   Time  I have discussed the procedure and information above with the patient (or patient’s representative) and answered their questions. The patient or their representative has agreed to have anesthesia services.     ___

## (undated) NOTE — MR AVS SNAPSHOT
[facility]  [address] [facility] [address]  [phone]               Thank you for choosing us for your health care visit with Rachael Welsh MD.  We are glad to serve you and happy to provide you with this summary o blood adequately), ischemic heart disease (the heart tissue doesn't get enough blood), and hypertensive hypertrophic cardiomyopathy (thickened, abnormally functioning heart muscle) are all associated with high blood pressure. ?  Kidney Disease: Hypertensi nutrients that help lower blood pressure, such as potassium, calcium and magnesium. Lifestyle Modification:   (AVG. SBP = Average Systolic Blood Pressure)  Lifestyle Modification Recommendations  Modification Recommendation Avg.  SBP Reduction Range   We Take 1 tablet (20 mg total) by mouth nightly. What changed:  See the new instructions.    Commonly known as:  PRAVACHOL                Where to Get Your Medications      These medications were sent to [facility], [address] [facility] increments are effective and add up over the week   2 ½ hours per week – spread out over time Use a urvashi to keep you motivated   Don’t forget strength training with weights and resistance Set goals and track your progress   You don’t need to join a gym.

## (undated) NOTE — LETTER
Marifer Haynes [address] [address]  [place], [address] [facility]    Consent for Operation  Date: __________________                                Time: _______________    1.  I authorize the performance upon Carolynn Pryor the following operation:  Procedure( procedure has been videotaped, the surgeon will obtain the original videotape. The hospital will not be responsible for storage or maintenance of this tape.   7. For the purpose of advancing medical education, I consent to the admittance of observers to the STATEMENTS REQUIRING INSERTION OR COMPLETION WERE FILLED IN.     Signature of Patient:   ___________________________    When the patient is a minor or mentally incompetent to give consent:  Signature of person authorized to consent for patient: ____________ supplements, and pills I can buy without a prescription (including street drugs/illegal medications). Failure to inform my anesthesiologist about these medicines may increase my risk of anesthetic complications. iv.  If I am allergic to anything or have ha Anesthesiologist Signature     Date   Time  I have discussed the procedure and information above with the patient (or patient’s representative) and answered their questions. The patient or their representative has agreed to have anesthesia services.     ___

## (undated) NOTE — IP AVS SNAPSHOT
Patient Demographics     Address  [address] [facility] [address] [facility] [place] Phone  [phone] [facility])  [phone] (Mobile) *Preferred* E-mail Address  [email or web address]      Emergency Contact(s)     Name Relation Home Work Mobile    Roz Witt o AQUACEL dressing is waterproof and does not require being covered to keep it dry.   o Pat dressing and surrounding skin dry after shower            AQUACEL      o Gauze dressings are NOT waterproof and REQUIRE being covered with a waterproof barrier to ke ? Watch for increased redness, warmth, any odor, increased drainage or opening of the incision. A little clear yellow or blood tinged drainage is normal up to 2 weeks after surgery but it should be less every day until it stops.   ? Call physician if you no of these medications. ? Apply ice or cold therapy to surgical site for 20 minutes at least four times a day, especially after therapy. Be sure there is a thin cloth barrier between skin and ice or cold therapy. ?  Change position at least every 45 minutes ? Schedule 10 days to 3 weeks after surgery WITH SURGEON’s office. ? Additional visits may need to be scheduled. Your physician will discuss this at first post-op office visit. ? Schedule outpatient physical therapy per your surgeon’s orders.   ? Schedule metal detector. The doctors no longer provide an identification card for this as they are easily copied.  ALSO request a wheelchair the first year to board and get off a plane…this aids in priority seating and you should sit on the aisle or at the bulkhead Lisinopril-Hydrochlorothiazide 20-12.5 MG Tabs      Take 1 tablet by mouth daily. Jerome Beach MD         ondansetron 4 MG Tbdp  Commonly known as:  ZOFRAN ODT      Take 1 tablet (4 mg total) by mouth every 8 (eight) hours as needed for Nausea.    Lizzy Morris [id number] TraMADol HCl (ULTRAM) tab 50 mg 02/15/18 0426 Given      [id number] docusate sodium (COLACE) cap 100 mg 02/14/18 2134 Given      [id number] docusate sodium (COLACE) cap 100 mg 02/15/18 0819 Given      [id number] ferrous sulfate EC tab 325 mg 02/15/ Physician    Filed:  2/6/2018  5:14 PM Date of Service:  2/6/2018  5:13 PM Status:  Signed    :  Krupa Crow Tx In    Scan on 2/6/2018  5:13 PM : DR NAQVI [facility] 1          Electronically signed by Rishabh Kent Tx In on 2/6/2018  5:14 PM   Attribution K Problem Relation Age of Onset   • Other Ned Fleischer Father      old age   • Renal Disease Mother        Allergies:   Pcn [Penicillins]       Swelling    Medications:    No current facility-administered medications on file prior to encounter.    Current Outpatie 1. TKA:  PT/OT, pain control  2. HTN:  Hold bp meds, resume if bp remains elevated  3. HL:  Statin    Quality:  · DVT Prophylaxis: Xarelto  · CODE status: Full Code  · Smalls: None    Plan of care discussed with Patient and family.     Edilson Zuñiga MD  2/13 Physical Exam: The incision is well healed without erythema, fluctuance or significant drainage. Motion is limited. Neurovascular exam is intact. Hospital Course: The patient was admitted to the hospital with the above named procedure.   They tolerated aspirin 81 MG Oral Tab          Follow up Visits: Follow-up with Dr. Deysi Dunn in 3 weeks      Autumn Monsalve  2/15/2018  8:33 AM[MS.1]    Electronically signed by Agusto Ortega MD on 2/15/2018  8:33 AM   Attribution Key    MS. 1 - Agusto Ortega MD on 2/1 Weight Bearing Restriction: L lower extremity           L Lower Extremity: Weight Bearing as Tolerated    PAIN ASSESSMENT   Ratin  Location: left knee  Management Techniques:  Activity promotion    BALANCE  Static Sitting: Good  Dynamic Sitting: Good  S phase and proper knee flexion during swing phase. Pt completed stair training with use of bilateral rail  with supervision level of assist via step to pattern.  Pt was trained on tub bench transfer to simulate car transfer technique with supervision and cue education; Family education;Gait training;Neuromuscular re-educate;Range of motion;Strengthening;Stoop training;Stair training;Transfer training;Balance training  Rehab Potential : Good  Frequency (Obs): BID    CURRENT GOALS     Goal #1  Patient is able to • Visual impairment     glasses for reading       Past Surgical History  Past Surgical History:  2001: ARTHROSCOPY OF JOINT UNLISTED Left  2000: CATH PERCUTANEOUS  TRANSLUMINAL CORONARY ANGIO*      Comment: x1  No date: TONSILLECTOMY      Comment: 2 nd gra Skilled Therapy Provided: Pt seen on POD#1 for exercises, patient education on proper LE placement at rest and mobility training. Pt was educated on exercises for strength and flexibility via verbal and written instructions.  Pt completed exercises as inst L Knee Extension (degrees): 9    Patient End of Session: Up in chair;Needs met;Call light within reach;RN aware of session/findings; All patient questions and concerns addressed;SCDs in place; Ice applied; Family present; Discussed recommendations with case ma    Goal Comments: Goals established on 2/13/2018. ongoing[BR.1]       Attribution Key    BR. 1 - Courtney Stratton, PTA on 2/14/2018  2:49 PM               Physical Therapy Note signed by Augie Grimes, PT at 2/13/2018  1:50 PM  Version 1 of 1    Author Prior Level of Drew: Patient was independent with ADLs & self care. Ambulated without AD. 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 Daughters will take turn to stay with patient while recovering @ home post hospital discharge.     SUBJECTIVE  \"It feels g How much help from another person does the patient currently need. ..   -   Moving to and from a bed to a chair (including a wheelchair)?: A Little   -   Need to walk in hospital room?: A Little   -   Climbing 3-5 steps with a railing?: A Little       AM-PA include h/o MI in 2000,HTN, Obesity. In this PT evaluation, the patient presents with the following impairments Decreased ROM & strength in left knee, pain in left knee @ surgical site. Functional outcome measures completed include AM PAC scores.   Based NP.1 - Janet Saucedo, PT on 2/13/2018  1:43 PM                        Occupational Therapy Notes (last 72 hours) (Notes from 2/12/2018  3:10 PM through 2/15/2018  3:10 PM)      Occupational Therapy Note signed by Laura Arzola OT at 2/14/2018  1:54 Shower/Tub and Equipment: Tub-shower combo  Other Equipment: None       Hand Dominance: Right  Drives: Yes  Patient Regularly Uses: Reading glasses    Prior Level of Function: Pt typically independent with ADLs and mobility. Pt does not use AD.     Zuly Morse dressing with education on adaptive equipment and toileting with use of adaptive equipment with supervision or higher. Patient End of Session: Up in chair;Needs met;Call light within reach;RN aware of session/findings; All patient questions and concerns Video Swallow Study Notes     No notes of this type exist for this encounter. SLP Notes     No notes of this type exist for this encounter.             Immunizations     Name Date      INFLUENZA 01/22/18     INFLUENZA 02/18/15     Pneumococcal (Prevnar

## (undated) NOTE — LETTER
09/01/17      [address] [place] [address]        Dear Kenzie Gann,    This letter is a reminder that you are due for blood work.    Blood work has been ordered by Dr. Carol Sam which requires a 12 hour fast.  You may drink water and/or lukasz

## (undated) NOTE — MR AVS SNAPSHOT
After Visit Summary   3/25/2019    Arlette Sosa    MRN: [id number]           Visit Information     Date & Time  3/25/2019  2:45 PM Provider  [facility] RM 1 Department  Leretha Comment in [place] Dept.  Phone  [phone]      Allergies as of 3/25/2019  R cannot be changed, so think of one that is secure and easy to remember. 6. Create a [employer] password. You can change your password at any time. 7. Choose a Security Question and enter your Answer and click Next.  This can be used at a later time if you fo Don’t forget strength training with weights and resistance Set goals and track your progress   You don’t need to join a gym. Home exercises work great.  Put more priority on exercise in your life           DM now offers Video Visits through [address] for a Conditions needing urgent attention, but are not life-threatening. Average cost  $120*       EMERGENCY ROOM         Life-threatening emergencies needing immediate intervention   at a hospital emergency room.       Average cost  $2,300*   *Cost colin

## (undated) NOTE — LETTER
[facility] [address], [address]  Consent for Procedure/Sedation    Date:     Time:       1. I authorize the performance upon Eulalio Alvarado the following:  VENOUS ACCESS PORT IMPLANT     2.  I authorize Dr. Sonny Balbuena (and Mac Joseph ________________________________    ___________________    Witness: _________________________      Date: ___________________    Printed: 5/10/2019   10:55 AM  Patient Name: Luna Fontana        : 1950       Medical Record #: [id number]

## (undated) NOTE — LETTER
Printed: 2019    Patient Name: Carolynn Pryor  : 1950   Medical Record #: [id number]    Consent to [address], understand that I have been diagnosed with metastatic lung cancer.     I understand that the treatment s Problems in other organs: Rash, change in eyesight, severe or persistent muscle or joint pains, severe muscle weakness, low red blood cell (Anemia)      Infusion Reactions: Chills, shaking, shortness of breath or wheezing, itching or rash, flushing, dizzin Name and ID for language line:_______________________________     _____    PHYSICIAN AFFIRMATION/DECLARATION  I have discussed the nature, purpose and risks of cancer treatment, alternative methods of treatment and the possibility of complications, includi

## (undated) NOTE — LETTER
[address] [place] [address] Department  Phone: [phone]  Right Fax: [phone]  [facility] 20 Connor Flores RN Date: 18    Patient Name: Pilar Hull  Surgery Date: 2018    CSN: [id number]  Medical Record: [id number]   :